# Patient Record
Sex: MALE | Race: WHITE | NOT HISPANIC OR LATINO | Employment: FULL TIME | URBAN - METROPOLITAN AREA
[De-identification: names, ages, dates, MRNs, and addresses within clinical notes are randomized per-mention and may not be internally consistent; named-entity substitution may affect disease eponyms.]

---

## 2017-01-29 ENCOUNTER — HOSPITAL ENCOUNTER (EMERGENCY)
Facility: CLINIC | Age: 62
Discharge: HOME | End: 2017-01-29
Attending: EMERGENCY MEDICINE | Admitting: EMERGENCY MEDICINE
Payer: COMMERCIAL

## 2017-01-29 VITALS
WEIGHT: 176.37 LBS | OXYGEN SATURATION: 95 % | HEIGHT: 69 IN | RESPIRATION RATE: 23 BRPM | DIASTOLIC BLOOD PRESSURE: 88 MMHG | TEMPERATURE: 97.5 F | SYSTOLIC BLOOD PRESSURE: 138 MMHG | BODY MASS INDEX: 26.12 KG/M2 | HEART RATE: 90 BPM

## 2017-01-29 DIAGNOSIS — T78.2XXA ANAPHYLAXIS, INITIAL ENCOUNTER: ICD-10-CM

## 2017-01-29 DIAGNOSIS — T78.40XA ALLERGIC REACTION, INITIAL ENCOUNTER: ICD-10-CM

## 2017-01-29 PROCEDURE — 96375 TX/PRO/DX INJ NEW DRUG ADDON: CPT

## 2017-01-29 PROCEDURE — 96365 THER/PROPH/DIAG IV INF INIT: CPT

## 2017-01-29 PROCEDURE — 99284 EMERGENCY DEPT VISIT MOD MDM: CPT | Mod: 25

## 2017-01-29 PROCEDURE — 25000125 ZZHC RX 250: Performed by: EMERGENCY MEDICINE

## 2017-01-29 PROCEDURE — 99291 CRITICAL CARE FIRST HOUR: CPT | Performed by: EMERGENCY MEDICINE

## 2017-01-29 RX ORDER — DIPHENHYDRAMINE HYDROCHLORIDE 50 MG/ML
50 INJECTION INTRAMUSCULAR; INTRAVENOUS ONCE
Status: COMPLETED | OUTPATIENT
Start: 2017-01-29 | End: 2017-01-29

## 2017-01-29 RX ORDER — EPINEPHRINE 0.3 MG/.3ML
0.3 INJECTION SUBCUTANEOUS
Qty: 0.6 ML | Refills: 1 | Status: SHIPPED | OUTPATIENT
Start: 2017-01-29

## 2017-01-29 RX ORDER — DEXAMETHASONE SODIUM PHOSPHATE 10 MG/ML
10 INJECTION, SOLUTION INTRAMUSCULAR; INTRAVENOUS ONCE
Status: COMPLETED | OUTPATIENT
Start: 2017-01-29 | End: 2017-01-29

## 2017-01-29 RX ORDER — PREDNISONE 10 MG/1
TABLET ORAL
Qty: 32 TABLET | Refills: 0 | Status: SHIPPED | OUTPATIENT
Start: 2017-01-29 | End: 2017-02-08

## 2017-01-29 RX ADMIN — FAMOTIDINE 20 MG: 20 INJECTION, SOLUTION INTRAVENOUS at 09:54

## 2017-01-29 RX ADMIN — DEXAMETHASONE SODIUM PHOSPHATE 10 MG: 10 INJECTION, SOLUTION INTRAMUSCULAR; INTRAVENOUS at 09:52

## 2017-01-29 RX ADMIN — DIPHENHYDRAMINE HYDROCHLORIDE 50 MG: 50 INJECTION, SOLUTION INTRAMUSCULAR; INTRAVENOUS at 09:48

## 2017-01-29 NOTE — ED AVS SNAPSHOT
[facility] Emergency Department    [address] [address]    Phone:  [phone]    Fax:  [phone]                                       Antione Block   MRN: [id number]    Department:  [facility] Emergency Department   Date of Visit:  1/29/2017           After Visit Summary Signature Page     I have received my discharge instructions, and my questions have been answered. I have discussed any challenges I see with this plan with the nurse or doctor.    ..........................................................................................................................................  Patient/Patient Representative Signature      ..........................................................................................................................................  Patient Representative Print Name and Relationship to Patient    ..................................................               ................................................  Date                                            Time    ..........................................................................................................................................  Reviewed by Signature/Title    ...................................................              ..............................................  Date                                                            Time

## 2017-01-29 NOTE — ED NOTES
Took Aleve at 0815  - became lightheaded with itching all over at 0845 - hives all over - about 0900 became soa  And left for the hospital - lungs clear

## 2017-01-29 NOTE — DISCHARGE INSTRUCTIONS
Avoid NSAIDs (Aleve, Naprosyn, naproxen, ibuprofen, Advil,) and aspirin until you follow up with the allergy clinic.    Prednisone burst and taper as directed.  This can cause insomnia and may also cause GI upset.  Take this with food.  Benadryl 25-50 mg every 4-6 hours as needed for rash or itching  Zantac or Tagamet over-the-counter tablet once to twice daily as needed for rash or itching    You were provided and EpiPen to use if you have recurrent hives, shortness of breath, chest tightness, or difficulty with speech or swallowing.  If he uses the EpiPen you must immediately go to the emergency room for reassessment.

## 2017-01-29 NOTE — ED AVS SNAPSHOT
[facility] Emergency Department    [address] [address]    Phone:  [phone]    Fax:  [phone]                                       Antione Block   MRN: [id number]    Department:  [facility] Emergency Department   Date of Visit:  1/29/2017           Patient Information     Date Of Birth          1955        Your diagnoses for this visit were:     Allergic reaction, initial encounter     Anaphylaxis, initial encounter        You were seen by Lupillo Acevedo MD.      Follow-up Information     Schedule an appointment as soon as possible for a visit with Winston Dominguez MD.    Specialty:  Allergy & Immunology    Contact information:    [facility]  [address] [address]  [phone]          Discharge Instructions       Avoid NSAIDs (Aleve, Naprosyn, naproxen, ibuprofen, Advil,) and aspirin until you follow up with the allergy clinic.    Prednisone burst and taper as directed.  This can cause insomnia and may also cause GI upset.  Take this with food.  Benadryl 25-50 mg every 4-6 hours as needed for rash or itching  Zantac or Tagamet over-the-counter tablet once to twice daily as needed for rash or itching    You were provided and EpiPen to use if you have recurrent hives, shortness of breath, chest tightness, or difficulty with speech or swallowing.  If he uses the EpiPen you must immediately go to the emergency room for reassessment.    Discharge References/Attachments     ANAPHYLAXIS, GENERAL (ENGLISH)      24 Hour Appointment Hotline       To make an appointment at any [facility], call [phone] ([phone]). If you don't have a family doctor or clinic, we will help you find one. [facility] are conveniently located to serve the needs of you and your family.             Review of your medicines      START taking        Dose / Directions Last dose taken    EPINEPHrine 0.3 MG/0.3ML injection   Dose:  0.3 mg   Quantity:  0.6  "mL        Inject 0.3 mLs (0.3 mg) into the muscle once as needed   Refills:  1        predniSONE 10 MG tablet   Commonly known as:  DELTASONE   Quantity:  32 tablet        Take 4 tablets daily for 5 days,  take 2 tablets daily for 3 days, take 1 tablet daily for 3 days, take half a tablet for 3 days.   Refills:  0          Our records show that you are taking the medicines listed below. If these are incorrect, please call your family doctor or clinic.        Dose / Directions Last dose taken    aspirin 81 MG tablet   Dose:  81 mg        Take 81 mg by mouth daily   Refills:  0                Prescriptions were sent or printed at these locations (2 Prescriptions)                   [facility] [place], [place] - [address]   [address] [address]    Telephone:  [phone]   Fax:  [phone]   Hours:                  E-Prescribed (2 of 2)         predniSONE (DELTASONE) 10 MG tablet               EPINEPHrine 0.3 MG/0.3ML injection                Orders Needing Specimen Collection     None      Pending Results     No orders found from 1/28/2017 to 1/30/2017.            Pending Culture Results     No orders found from 1/28/2017 to 1/30/2017.             Test Results from your hospital stay            Thank you for choosing [facility]       Thank you for choosing [facility] for your care. Our goal is always to provide you with excellent care. Hearing back from our patients is one way we can continue to improve our services. Please take a few minutes to complete the written survey that you may receive in the mail after you visit with us. Thank you!        [facility] Information     [facility] lets you send messages to your doctor, view your test results, renew your prescriptions, schedule appointments and more. To sign up, go to [email or web address] . Click on \"Log in\" on the left side of the screen, which will take you to the Welcome page. Then click on \"Sign up Now\" on the right side of the " page.     You will be asked to enter the access code listed below, as well as some personal information. Please follow the directions to create your username and password.     Your access code is: [email or web address]  Expires: 2017  1:29 PM     Your access code will  in 90 days. If you need help or a new code, please call your [facility] clinic or [phone].        Care EveryWhere ID     This is your Care EveryWhere ID. This could be used by other organizations to access your [facility] medical records  [id number]        After Visit Summary       This is your record. Keep this with you and show to your community pharmacist(s) and doctor(s) at your next visit.

## 2017-01-29 NOTE — ED PROVIDER NOTES
History     Chief Complaint   Patient presents with     Allergic Reaction     sob and rerd     HPI  Mr. Antione Block is a 61 year old male who presents to the ED today for evaluation of allergic reaction. The patient reports sudden onset of rash and erythema after taking Aleve for a headache this morning, approximately 30 minutes before arrival to the ED. The patient states the rash to his trunk and face with bilateral eye redness began about 15 minutes after taking the Aleve. The patient presented to the ED with O2 stat of 88%, improving to 95% after being roomed. The patient's wife reports he was short of breath and wheezing during initial onset of her symptoms but this has since resolved. He did not take anything for his current symptoms. He denies past incident of pneumonia or known allergies. He denies cough, fever, vomiting, cold, abdominal pain, nausea, vomiting, or diarrhea.  Patient has never had similar reaction previously.  He has used this product previously.  He is also on daily aspirin.  He did not take that today however.  No other new exposures or recent illness.  Patient had a mild to moderate headache today.  That has resolved.    I have reviewed the Medications, Allergies, Past Medical and Surgical History, and Social History in the [facility] system.  History reviewed. No pertinent past medical history.  Patient Active Problem List   Diagnosis     CARDIOVASCULAR SCREENING; LDL GOAL LESS THAN 130     Tobacco use disorder     Current Facility-Administered Medications   Medication     famotidine (PEPCID) infusion 20 mg     Current Outpatient Prescriptions   Medication     aspirin 81 MG tablet     predniSONE (DELTASONE) 10 MG tablet     EPINEPHrine 0.3 MG/0.3ML injection      No Known Allergies  Social History     Social History     Marital Status:      Spouse Name: N/A     Number of Children: N/A     Years of Education: N/A     Occupational History     Not on file.     Social History Main  "Topics     Smoking status: Current Every Day Smoker -- 1.00 packs/day for 43 years     Types: Cigarettes     Smokeless tobacco: Never Used     Alcohol Use: Yes     Drug Use: No     Sexual Activity: Not on file     Other Topics Concern     Parent/Sibling W/ Cabg, Mi Or Angioplasty Before 65f 55m? Yes     Social History Narrative     Family History   Problem Relation Age of Onset     CANCER Mother      bone and lung     Cardiovascular Brother      Review of Systems  All other systems reviewed and are negative.  Physical Exam   Pulse: 90  Temp: 97.5  F (36.4  C)  Resp: 18  Height: 175.3 cm (5' 9\")  Weight: 80 kg (176 lb 5.9 oz)  SpO2: (!) 88 %  Physical Exam Gen. alert cooperative male in moderate distress.  HEENT shows intense scleral injection with watery but no mattering.  No facial or lip swelling.  Oral mucosa is moist and there is no soft palate or posterior pharyngeal edema.  He is able to speak in complete sentences.  Neck reveals no stridor.  Lungs have no active wheezing.  Cardiac regular rate and not tachycardic.  Abdomen reveals active bowel sounds on palpation there is no localizing tenderness, masses, organomegaly.  Skin reveals diffuse confluence hives on his face, neck, trunk and extremities.  There is no open or draining lesions.    ED Course   Procedures             Critical Care time:  none               Labs Ordered and Resulted from Time of ED Arrival Up to the Time of Departure from the ED - No data to display  No results found for this or any previous visit (from the past 24 hour(s)).    Medications   famotidine (PEPCID) infusion 20 mg (0 mg Intravenous Stopped 1/29/17 1025)   dexamethasone (DECADRON) injection 10 mg (10 mg Intravenous Given 1/29/17 4791)   diphenhydrAMINE (BENADRYL) injection 50 mg (50 mg Intravenous Given 1/29/17 0948)       9:38 AM Patient Assessed.  IV is established.  Patient was given Benadryl, Decadron, and Pepcid.  At 10 AM on recheck the patient improving.  He was not " tachycardic or hypotensive.  With O2 by nasal cannula and his sats were 95%.  At 10:15 AM on recheck the rash and intensity of the redness in his eye was resolving.  He had no respiratory complaints denied chest pain.  Sats were 95%.  At 11 AM on recheck the patient was resting complete.  The rash had resolved.  The intensity of his eye redness had resolved.  He had no adventitious lung sounds.  No neck stridor.  His O2 sat was 97% so the O2 was discontinue  At 12 PM and check no change in patient's comfortable.  At 1 PM on recheck no change.  O2 sat maintaining in the mid 90s without O2  At 1:30 PM on recheck patient is resting comfortable.  No hives are noted.  His eyes have cleared.  He had no stridor on neck exam and no wheezing on lung exam.  Assessments & Plan (with Medical Decision Making)   Patient is a 61-year-old male presents to the emergency room with hives and hypoxia after he took Aleve earlier this morning.  Approximately 15 minutes after taking the Aleve he developed itchy rash on his entire body.  He did not have change in his voice or difficulty swallowing.  He denies chest pain and felt mildly short of breath.  No abdominal pain or nausea.  No similar previous reactions.  He's not had allergies to other medicines.  No other new exposures or illness.  He did have a mild headache earlier and took the Aleve for that.  His headache has resolved.  He is a smoker.  He denies COPD.  He does take aspirin but has done that for years and has never had problems.  On exam initially he was hypoxic with sats in the upper 80s.  His vitals are stable.  He was not tachycardic or hypotensive.  He was able to speak in complete sentences.  He did have injection and watering of his eyes.  Neck revealed no stridor and lungs had no adventitious sounds.  Patient had an IV established given fluids, IV Benadryl, Pepcid, and Decadron.  He was given O2 by nasal cannula and had slow improvement and resolution of his symptoms.   He was observed for an extended period of time thereafter and his vital signs remained stable.  He had no recurrent hives or hypoxia.  Suspect his symptoms are related to the Aleve.  I recommend he avoid Aleve or similar products such as Naprosyn, Advil, ibuprofen and avoid his aspirin temporarily.  I would like to have him follow up with allergist for further testing.  He is given EpiPen for recurrence with the understanding needs to return to emergency room if he has similar symptoms and uses the EpiPen.  In the meantime we'll put him on a steroid burst and taper.  Have him continue Benadryl and Zantac or Tagamet as needed.  Handout on anaphylaxis is provided.  With the severity of the patient's symptoms, treatment with IV medicines and repeated checks there was 45 minutes of critical care time.  I have reviewed the nursing notes.    I have reviewed the findings, diagnosis, plan and need for follow up with the patient.    New Prescriptions    EPINEPHRINE 0.3 MG/0.3ML INJECTION    Inject 0.3 mLs (0.3 mg) into the muscle once as needed    PREDNISONE (DELTASONE) 10 MG TABLET    Take 4 tablets daily for 5 days,  take 2 tablets daily for 3 days, take 1 tablet daily for 3 days, take half a tablet for 3 days.       Final diagnoses:   Allergic reaction, initial encounter   Anaphylaxis, initial encounter     This document serves as a record of the services and decisions personally performed and made by Lupillo Acevedo MD. It was created on HIS/HER behalf by Joe Weiner, a trained medical scribe. The creation of this document is based the provider's statements to the medical scribe.  Joe Weiner 9:38 AM 1/29/2017    Provider:   The information in this document, created by the medical scribe for me, accurately reflects the services I personally performed and the decisions made by me. I have reviewed and approved this document for accuracy prior to leaving the patient care area.  Lupillo Acevedo MD 9:38 AM  1/29/2017 1/29/2017   [facility] EMERGENCY DEPARTMENT      Lupillo Acevedo MD  01/29/17 1320    Lupillo Acevedo MD  01/29/17 1323    Lupillo Acevedo MD  01/29/17 3257

## 2017-01-30 ENCOUNTER — OFFICE VISIT (OUTPATIENT)
Dept: ALLERGY | Facility: CLINIC | Age: 62
End: 2017-01-30
Payer: COMMERCIAL

## 2017-01-30 VITALS
SYSTOLIC BLOOD PRESSURE: 127 MMHG | BODY MASS INDEX: 29.03 KG/M2 | DIASTOLIC BLOOD PRESSURE: 70 MMHG | HEIGHT: 69 IN | OXYGEN SATURATION: 95 % | WEIGHT: 196 LBS | TEMPERATURE: 97 F | HEART RATE: 75 BPM

## 2017-01-30 DIAGNOSIS — T50.905A MEDICATION REACTION, INITIAL ENCOUNTER: Primary | ICD-10-CM

## 2017-01-30 PROCEDURE — 99203 OFFICE O/P NEW LOW 30 MIN: CPT | Performed by: ALLERGY & IMMUNOLOGY

## 2017-01-30 ASSESSMENT — ENCOUNTER SYMPTOMS
ACTIVITY CHANGE: 0
CHEST TIGHTNESS: 0
DIARRHEA: 0
EYE ITCHING: 0
STRIDOR: 0
COUGH: 0
ADENOPATHY: 0
WHEEZING: 0
FEVER: 0
VOMITING: 0
EYE REDNESS: 0
SHORTNESS OF BREATH: 0
FATIGUE: 0
EYE DISCHARGE: 0
SINUS PRESSURE: 0
MYALGIAS: 0
NAUSEA: 0
RHINORRHEA: 0
HEADACHES: 0
FACIAL SWELLING: 0

## 2017-01-30 NOTE — NURSING NOTE
"Chief Complaint   Patient presents with     Allergy Consult     Ed visit 1/29       Initial /70 mmHg  Pulse 75  Temp(Src) 97  F (36.1  C)  Ht 5' 9\" (1.753 m)  Wt 196 lb (88.905 kg)  BMI 28.93 kg/m2  SpO2 95% Estimated body mass index is 28.93 kg/(m^2) as calculated from the following:    Height as of this encounter: 5' 9\" (1.753 m).    Weight as of this encounter: 196 lb (88.905 kg).  BP completed using cuff size: regular    "

## 2017-01-30 NOTE — PROGRESS NOTES
SUBJECTIVE:                                                               Antione Block presents today to our Allergy Clinic at [facility]; he is being seen for a new patient visit.   He is a 61 year old male with a recent episode of allergic reaction.  The patient is accompanied by spouse. The spouse helps providing the history.     The patient reports sudden onset of generalized rash and erythema of his eyes after taking Aleve for a headache this morning, approximately 15 min after ingestion. He describes rash as pruritic, red, raised skin lesions of different sizes.  He also developed dyspnea. No wheezing. No tongue/lip/throat swelling. No nausea, vomiting or diarrhea. Had some dizziness as well. His wife gave him 3 cups of Benadryl, and took him to ED.   He did not take anything for his current symptoms. He denies cough, fever, vomiting, cold, abdominal pain, nausea, vomiting, or diarrhea. it was his first reaction like this. He was able to take Aleve in the past, nut not frequently.  He was also on daily aspirin 81 tab but he did not take that on that day. That morning all he ingested besides Aleve was a cup of coffee.     In ED, he got better with Benadryl, Pepcid, and Decadron.  He was given O2 by nasal cannula as well. No epi.Symptoms got much better in ~5 hrs after ingestion. Was prescribed EpiPen though that he has with him today.    He was presrcibed prednisone for 5 days. He takes Zantac 150 mg BID, and Benadryl daily.   He denies having baseline recurrent acute or chronic urticaria.  Has no history of nasal polyposis. He denies other episodes of wheezing, chest tightness or dyspnea.      Patient Active Problem List   Diagnosis     CARDIOVASCULAR SCREENING; LDL GOAL LESS THAN 130     Tobacco use disorder       History reviewed. No pertinent past medical history.   Problem (# of Occurrences) Relation (Name,Age of Onset)    CANCER (1) Mother: bone and lung    Cardiovascular (1)  Brother        History reviewed. No pertinent past surgical history.  Social History     Social History     Marital Status:      Spouse Name: N/A     Number of Children: N/A     Years of Education: N/A     Social History Main Topics     Smoking status: Current Every Day Smoker -- 0.25 packs/day for 43 years     Types: Cigarettes     Smokeless tobacco: Never Used     Alcohol Use: Yes      Comment: Smokes e cigerettes 1/30/17     Drug Use: No     Sexual Activity: Not Asked     Other Topics Concern     Parent/Sibling W/ Cabg, Mi Or Angioplasty Before 65f 55m? Yes     Social History Narrative    ENVIRONMENTAL HISTORY: The family [place] older home(35 years old[place]. The home is heated with a forced air and gas furnace. They does have central air conditioning. The patient's bedroom is furnished with stuffed animals in bed, hard delon in bedroom, allergen mattress cover and fabric window coverings.   There is not history of cockroach or mice infestation. There is/are 1 smokers not in the house.  The house does not have a damp basement.                Review of Systems   Constitutional: Negative for fever, activity change and fatigue.   HENT: Negative for congestion, ear pain, facial swelling, nosebleeds, postnasal drip, rhinorrhea, sinus pressure and sneezing.    Eyes: Negative for discharge, redness and itching.   Respiratory: Negative for cough, chest tightness, shortness of breath, wheezing and stridor.    Gastrointestinal: Negative for nausea, vomiting and diarrhea.   Musculoskeletal: Negative for myalgias.   Skin: Negative for rash.   Neurological: Negative for headaches.   Hematological: Negative for adenopathy.   Psychiatric/Behavioral: Negative for behavioral problems and self-injury.           Current outpatient prescriptions:      predniSONE (DELTASONE) 10 MG tablet, Take 4 tablets daily for 5 days,  take 2 tablets daily for 3 days, take 1 tablet daily for 3 days, take half a  "tablet for 3 days., Disp: 32 tablet, Rfl: 0     EPINEPHrine 0.3 MG/0.3ML injection, Inject 0.3 mLs (0.3 mg) into the muscle once as needed, Disp: 0.6 mL, Rfl: 1     aspirin 81 MG tablet, Take 81 mg by mouth daily, Disp: , Rfl:   Immunization History   Administered Date(s) Administered     TDAP (ADACEL AGES 11-64) 03/25/2010     Allergies   Allergen Reactions     Aleve [Naproxen] Anaphylaxis     OBJECTIVE:                                                                 /70 mmHg  Pulse 75  Temp(Src) 97  F (36.1  C)  Ht 5' 9\" (1.753 m)  Wt 196 lb (88.905 kg)  BMI 28.93 kg/m2  SpO2 95%        Physical Exam   Constitutional: He is oriented to person, place, and time. No distress.   HENT:   Head: Normocephalic and atraumatic.   Right Ear: Tympanic membrane, external ear and ear canal normal.   Left Ear: Tympanic membrane, external ear and ear canal normal.   Nose: No mucosal edema or rhinorrhea.   Eyes: Conjunctivae are normal. Right eye exhibits no discharge. Left eye exhibits no discharge.   Neck: Normal range of motion.   Cardiovascular: Normal rate, regular rhythm and normal heart sounds.    No murmur heard.  Pulmonary/Chest: Effort normal and breath sounds normal. No respiratory distress. He has no wheezes. He has no rales.   Musculoskeletal: Normal range of motion.   Lymphadenopathy:     He has no cervical adenopathy.   Neurological: He is alert and oriented to person, place, and time.   Skin: Skin is warm. He is not diaphoretic.   Psychiatric: Mood, memory and affect normal.   Nursing note and vitals reviewed.        ASSESSMENT/PLAN:      Problem List Items Addressed This Visit     None      Visit Diagnoses     1. Medication reaction, initial encounter    -  Primary  Reaction to NSAID, type IV (Blended (mixed respiratory and/or cutaneous) reactions in otherwise asymptomatic individuals) versus Type V or type 6.  -Finish prednisone as presrcibed.  Zantac 150 mg by mouth twice a day for 10 days, and then " stop.  Zyrtec (cetirizine) 10 mg by mouth once a day at bedtime for 10 days, then stop.  Provided with a list of ASA/NSAIDs containing drugs.  The patient has an interest to take aspirin in the future, or there is a medical indication for that, I suggest a diagnostic challenge with ASA to confirm that he may not be sensitive to all GALLAGHER-1 inhibitors. The patient does not seem to be sure about it. His wife is more worried about his health and some family history of cardiac problems. They will talk with PCP/or cardiologist, and then call us to schedule the challenge if they decide to do that.           Thank you for allowing us to participate in the care of this patient. Please feel free to contact us if there are any questions or concerns about the patient.    Disclaimer: This note consists of symbols derived from keyboarding, dictation and/or voice recognition software. As a result, there may be errors in the script that have gone undetected. Please consider this when interpreting information found in this chart.    Winston Dominguez MD   Allergy, Asthma and Immunology  [place], [place] and [facility]

## 2017-01-30 NOTE — MR AVS SNAPSHOT
After Visit Summary   1/30/2017    Antione Blcok    MRN: [id number]           Patient Information     Date Of Birth          1955        Visit Information        Provider Department      1/30/2017 12:20 PM Winston Dominguez MD [facility]        Today's Diagnoses     Medication reaction, initial encounter    -  1       Care Instructions    Finish prednisone as presrcibed.  Zantac 150 mg by mouth twice a day for 10 days, and then stop.  Zyrtec (cetirizine) 10 mg by mouth once a day at bedtime for 10 days, then stop.      APIRINS/NSAID-SENSITIVE INDIVIDUALS: PRODUCTS TO AVOID  ASPIRIN (ACETYLSALICILIC ACID)  Chanel-[place]/Plus Duoprin Mobigesic Tablets  Arthritis Pain Formula Duradyne Hs-Dhv-Xjofx Tablets  Anacin Dynosal Pabalate   Ascriptin Ecotrin Palbrin Buffered Tablets  Aspergum Efficin Pepto-Bismol  Blas Emagin Percodan  Bufferin Empirin Propoxyphene  Cama Excedrin (aspirin formula) 65 (Darvon Compound)  Congesprin 4-Way Cold Tablets Robasisal Tablets  [place] Fiorinal Sine-Off Sinus Medicine  Coridicin Gaysal-S Synalogos  Cosprin Gemnisin Talwin  Dasin Measurin Triaminicin  Zachary s Pills Midol Caplets Vanquish    METHYL SALICYLATE (WINTERGREEN)  (Topical anti-pain or anti-swelling products)  Panchito Schmidt Vicks Sinex Listerine  Heet Decongestant Spray Mentholatum  Icy Hot Heating Lotion Soltice     NONSTEROIDAL ANTI-INFLAMMATORY AGENTS  (Cross-react in aspirin-sensitive individuals)  Alclofenac (Mervan) Ketorolac (Toradol)  Azapropaxone (Rheumox) Meclofenamate (Meclomen)  Diclofenac (Voltarol) Mefenamic Acid (Ponstel)  Diflunisal (Colobid) Nabumetone (Relafen)  Etodolac (Lodine) Naproxen (Naprosyn, Anaprox)  Fenoprofen (Nalfon) Oxyprozin (Daypro)  Feprazone (Methrazone) Pehnylbutazone (Veutazolidin, Azolid)  Flurbiprofen (Ansaid) Oxalic (Tandearil)  Ibuprofen (Motrin, Nuprin, Advil, Medipren) Piroxicam (Feldene)  Trandar (Rufen) Sulindac (Clinoril)  Indomethacin (Indocin) Tometin  "(Tolecctin)  Ketoprofen (Orudis) Zomepirac (Zomax)  This list is a guide, but is not all inclusive of every medication or ingredient. Check with your doctor and pharmacist regarding specific contents for medications. Read ingredient labels carefully.            Follow-ups after your visit        Who to contact     If you have questions or need follow up information about today's clinic visit or your schedule please contact [facility] directly at [phone].  Normal or non-critical lab and imaging results will be communicated to you by [employer], letter or phone within 4 business days after the clinic has received the results. If you do not hear from us within 7 days, please contact the clinic through [employer] or phone. If you have a critical or abnormal lab result, we will notify you by phone as soon as possible.  Submit refill requests through [employer] ([employer]) or call your pharmacy and they will forward the refill request to us. Please allow 3 business days for your refill to be completed.          Additional Information About Your Visit        [employer] [facility] Hospital[facility] Information     [employer] ([employer]) lets you send messages to your doctor, view your test results, renew your prescriptions, schedule appointments and more. To sign up, go to [email or web address] ([employer]) . Click on \"Log in\" on the left side of the screen, which will take you to the Welcome page. Then click on \"Sign up Now\" on the right side of the page.     You will be asked to enter the access code listed below, as well as some personal information. Please follow the directions to create your username and password.     Your access code is: [email or web address]  Expires: 2017  1:29 PM     Your access code will  in 90 days. If you need help or a new code, please call your [facility] or [phone].        Care EveryWhere ID     This is your Care EveryWhere ID. This could be used by other organizations to access your [place] medical records  [id number]        Your " "Vitals Were     Pulse Temperature Height BMI (Body Mass Index) Pulse Oximetry       75 97  F (36.1  C) 5' 9\" (1.753 m) 28.93 kg/m2 95%        Blood Pressure from Last 3 Encounters:   01/30/17 127/70   01/29/17 138/88   09/20/16 110/64    Weight from Last 3 Encounters:   01/30/17 196 lb (88.905 kg)   01/29/17 176 lb 5.9 oz (80 kg)   09/20/16 200 lb 12.8 oz (91.082 kg)              Today, you had the following     No orders found for display       Primary Care Provider    Clinic Unassigned MD MARGARET       No address on file        Thank you!     Thank you for choosing [facility]  for your care. Our goal is always to provide you with excellent care. Hearing back from our patients is one way we can continue to improve our services. Please take a few minutes to complete the written survey that you may receive in the mail after your visit with us. Thank you!             Your Updated Medication List - Protect others around you: Learn how to safely use, store and throw away your medicines at www.disposemymeds.org.          This list is accurate as of: 1/30/17  1:30 PM.  Always use your most recent med list.                   Brand Name Dispense Instructions for use    aspirin 81 MG tablet      Take 81 mg by mouth daily       EPINEPHrine 0.3 MG/0.3ML injection     0.6 mL    Inject 0.3 mLs (0.3 mg) into the muscle once as needed       predniSONE 10 MG tablet    DELTASONE    32 tablet    Take 4 tablets daily for 5 days,  take 2 tablets daily for 3 days, take 1 tablet daily for 3 days, take half a tablet for 3 days.         "

## 2017-01-30 NOTE — PATIENT INSTRUCTIONS
Finish prednisone as presrcibed.  Zantac 150 mg by mouth twice a day for 10 days, and then stop.  Zyrtec (cetirizine) 10 mg by mouth once a day at bedtime for 10 days, then stop.      APIRINS/NSAID-SENSITIVE INDIVIDUALS: PRODUCTS TO AVOID  ASPIRIN (ACETYLSALICILIC ACID)  Chanel-[place]/Plus Duoprin Mobigesic Tablets  Arthritis Pain Formula Duradyne Au-Zpy-Qczcj Tablets  Anacin Dynosal Pabalate   Ascriptin Ecotrin Palbrin Buffered Tablets  Aspergum Efficin Pepto-Bismol  Blas Emagin Percodan  Bufferin Empirin Propoxyphene  Cama Excedrin (aspirin formula) 65 (Darvon Compound)  Congesprin 4-Way Cold Tablets Robasisal Tablets  [place] Fiorinal Sine-Off Sinus Medicine  Coridicin Gaysal-S Synalogos  Cosprin Gemnisin Talwin  Dasin Measurin Triaminicin  Zachary s Pills Midol Caplets Vanquish    METHYL SALICYLATE (WINTERGREEN)  (Topical anti-pain or anti-swelling products)  Panchito Schmidt Vicks Sinex Listerine  Heet Decongestant Spray Mentholatum  Icy Hot Heating Lotion Soltice     NONSTEROIDAL ANTI-INFLAMMATORY AGENTS  (Cross-react in aspirin-sensitive individuals)  Alclofenac (Mervan) Ketorolac (Toradol)  Azapropaxone (Rheumox) Meclofenamate (Meclomen)  Diclofenac (Voltarol) Mefenamic Acid (Ponstel)  Diflunisal (Colobid) Nabumetone (Relafen)  Etodolac (Lodine) Naproxen (Naprosyn, Anaprox)  Fenoprofen (Nalfon) Oxyprozin (Daypro)  Feprazone (Methrazone) Pehnylbutazone (Veutazolidin, Azolid)  Flurbiprofen (Ansaid) Oxalic (Tandearil)  Ibuprofen (Motrin, Nuprin, Advil, Medipren) Piroxicam (Feldene)  Trandar (Rufen) Sulindac (Clinoril)  Indomethacin (Indocin) Tometin (Tolecctin)  Ketoprofen (Orudis) Zomepirac (Zomax)  This list is a guide, but is not all inclusive of every medication or ingredient. Check with your doctor and pharmacist regarding specific contents for medications. Read ingredient labels carefully.

## 2017-03-23 ENCOUNTER — TELEPHONE (OUTPATIENT)
Dept: FAMILY MEDICINE | Facility: CLINIC | Age: 62
End: 2017-03-23

## 2017-03-23 DIAGNOSIS — Z12.11 SPECIAL SCREENING FOR MALIGNANT NEOPLASMS, COLON: Primary | ICD-10-CM

## 2017-03-23 NOTE — TELEPHONE ENCOUNTER
Panel Management Review      Patient has the following on his problem list: None      Composite cancer screening  Chart review shows that this patient is due/due soon for the following Fecal Colorectal (FIT)  Summary:    Patient is due/failing the following:   FIT    Action needed:   Patient needs referral/order: fit    Type of outreach:    Phone, spoke to patient.  agreed to complete Fit test    Questions for provider review:    None                                                                                                                                    Eli Colmenares MA       Chart routed to Care Team .

## 2018-02-06 ENCOUNTER — TRANSFERRED RECORDS (OUTPATIENT)
Dept: HEALTH INFORMATION MANAGEMENT | Facility: CLINIC | Age: 63
End: 2018-02-06

## 2018-04-25 ENCOUNTER — OFFICE VISIT (OUTPATIENT)
Dept: FAMILY MEDICINE | Facility: CLINIC | Age: 63
End: 2018-04-25
Payer: COMMERCIAL

## 2018-04-25 VITALS
DIASTOLIC BLOOD PRESSURE: 64 MMHG | BODY MASS INDEX: 28.14 KG/M2 | RESPIRATION RATE: 18 BRPM | TEMPERATURE: 97.8 F | HEIGHT: 69 IN | WEIGHT: 190 LBS | SYSTOLIC BLOOD PRESSURE: 114 MMHG | HEART RATE: 64 BPM

## 2018-04-25 DIAGNOSIS — F17.200 TOBACCO USE DISORDER: ICD-10-CM

## 2018-04-25 DIAGNOSIS — Z00.00 ROUTINE HISTORY AND PHYSICAL EXAMINATION OF ADULT: ICD-10-CM

## 2018-04-25 DIAGNOSIS — Z12.11 SPECIAL SCREENING FOR MALIGNANT NEOPLASMS, COLON: Primary | ICD-10-CM

## 2018-04-25 PROCEDURE — 99396 PREV VISIT EST AGE 40-64: CPT | Performed by: PHYSICIAN ASSISTANT

## 2018-04-25 RX ORDER — VARENICLINE TARTRATE 1 MG/1
1 TABLET, FILM COATED ORAL 2 TIMES DAILY
Qty: 56 TABLET | Refills: 3 | Status: SHIPPED | OUTPATIENT
Start: 2018-04-25 | End: 2021-03-30

## 2018-04-25 NOTE — PROGRESS NOTES
SUBJECTIVE:   CC: Antione Block is an 62 year old male who presents for preventative health visit.  it has multiple screening test performed yearly and brings those reports and with him.  This includes lipids, carotid ultrasound, EKG, metabolic panel and CBC and copies were made of these reports and will be scanned into the chart he has no concerns at this point other than.  His strong family history of heart disease.  He denies history of chest pain, shortness of breath or other symptoms.  He is trying to quit smoking and we discussed medication possibilities.    Physical   Annual:     Getting at least 3 servings of Calcium per day::  Yes    Bi-annual eye exam::  Yes    Dental care twice a year::  NO    Sleep apnea or symptoms of sleep apnea::  Daytime drowsiness and Excessive snoring    Diet::  Regular (no restrictions)    Frequency of exercise::  None    Taking medications regularly::  No    Barriers to taking medications::  Side effects    Medication side effects::  Other    Additional concerns today::  No                Today's PHQ-2 Score:   PHQ-2 ( 1999 Pfizer) 4/25/2018   Q1: Little interest or pleasure in doing things 0   Q2: Feeling down, depressed or hopeless 0   PHQ-2 Score 0   Q1: Little interest or pleasure in doing things Not at all   Q2: Feeling down, depressed or hopeless Not at all   PHQ-2 Score 0       Abuse: Current or Past(Physical, Sexual or Emotional)- No  Do you feel safe in your environment - Yes    Social History   Substance Use Topics     Smoking status: Current Every Day Smoker     Packs/day: 0.25     Years: 43.00     Types: Cigarettes     Smokeless tobacco: Never Used     Alcohol use Yes      Comment: Smokes e cigerettes 1/30/17     Alcohol Use 4/25/2018   If you drink alcohol do you typically have greater than 3 drinks per day OR greater than 7 drinks per week? No   No flowsheet data found.    Last PSA: No results found for: PSA    Reviewed orders with patient. Reviewed health  maintenance and updated orders accordingly - Yes  Labs reviewed in EPIC  BP Readings from Last 3 Encounters:   04/25/18 114/64   01/30/17 127/70   01/29/17 138/88    Wt Readings from Last 3 Encounters:   04/25/18 190 lb (86.2 kg)   01/30/17 196 lb (88.9 kg)   01/29/17 176 lb 5.9 oz (80 kg)                  Patient Active Problem List   Diagnosis     CARDIOVASCULAR SCREENING; LDL GOAL LESS THAN 130     Tobacco use disorder     History reviewed. No pertinent surgical history.    Social History   Substance Use Topics     Smoking status: Current Every Day Smoker     Packs/day: 0.25     Years: 43.00     Types: Cigarettes     Smokeless tobacco: Never Used     Alcohol use No      Comment: Smokes e cigerettes 1/30/17     Family History   Problem Relation Age of Onset     CANCER Mother      bone and lung     Cardiovascular Brother          Current Outpatient Prescriptions   Medication Sig Dispense Refill     EPINEPHrine 0.3 MG/0.3ML injection Inject 0.3 mLs (0.3 mg) into the muscle once as needed 0.6 mL 1     varenicline (CHANTIX STARTING MONTH PAK) 0.5 MG X 11 & 1 MG X 42 tablet Take 0.5 mg tab daily for 3 days, then 0.5 mg tab twice daily for 4 days, then 1 mg twice daily. 53 tablet 0     varenicline (CHANTIX) 1 MG tablet Take 1 tablet (1 mg) by mouth 2 times daily 56 tablet 3     aspirin 81 MG tablet Take 81 mg by mouth daily       Allergies   Allergen Reactions     Aleve [Naproxen] Anaphylaxis     Recent Labs   Lab Test  09/20/16   1315  02/02/15   0916   LDL  137*  121   HDL  38*  36*   TRIG  177*  176*   CR  0.89  0.91   GFRESTIMATED  87  85   GFRESTBLACK  >90   GFR Calc    >90   GFR Calc     POTASSIUM  4.0  4.2        Reviewed and updated as needed this visit by clinical staff         Reviewed and updated as needed this visit by Provider        History reviewed. No pertinent past medical history.   History reviewed. No pertinent surgical history.    Review of Systems  C: NEGATIVE for  fever, chills, change in weight  I: NEGATIVE for worrisome rashes, moles or lesions  E: NEGATIVE for vision changes or irritation  ENT: NEGATIVE for ear, mouth and throat problems  R: NEGATIVE for significant cough or SOB  B: NEGATIVE for masses, tenderness or discharge  CV: NEGATIVE for chest pain, palpitations or peripheral edema  GI: NEGATIVE for nausea, abdominal pain, heartburn, or change in bowel habits   male: negative for dysuria, hematuria, decreased urinary stream, erectile dysfunction, urethral discharge  M: NEGATIVE for significant arthralgias or myalgia  N: NEGATIVE for weakness, dizziness or paresthesias  P: NEGATIVE for changes in mood or affect    OBJECTIVE:   There were no vitals taken for this visit.    Physical Exam  GENERAL: healthy, alert and no distress  EYES: Eyes grossly normal to inspection, PERRL and conjunctivae and sclerae normal  HENT: ear canals and TM's normal, nose and mouth without ulcers or lesions  NECK: no adenopathy, no asymmetry, masses, or scars and thyroid normal to palpation  RESP: lungs clear to auscultation - no rales, rhonchi or wheezes  BREAST: normal without masses, tenderness or nipple discharge and no palpable axillary masses or adenopathy  CV: regular rate and rhythm, normal S1 S2, no S3 or S4, no murmur, click or rub, no peripheral edema and peripheral pulses strong  ABDOMEN: soft, nontender, no hepatosplenomegaly, no masses and bowel sounds normal  MS: no gross musculoskeletal defects noted, no edema  SKIN: no suspicious lesions or rashes  NEURO: Normal strength and tone, mentation intact and speech normal  PSYCH: mentation appears normal, affect normal/bright    ASSESSMENT/PLAN:       ICD-10-CM    1. Special screening for malignant neoplasms, colon Z12.11 Fecal colorectal cancer screen FIT   2. Tobacco use disorder F17.200 varenicline (CHANTIX STARTING MONTH KULWANT) 0.5 MG X 11 & 1 MG X 42 tablet     varenicline (CHANTIX) 1 MG tablet   3. Routine history and  "physical examination of adult Z00.00        COUNSELING:   Reviewed preventive health counseling, as reflected in patient instructions       Consider AAA screening for ages 65-75 and smoking history       Regular exercise       Healthy diet/nutrition       Vision screening       Hearing screening      Answers for HPI/ROS submitted by the patient on 4/25/2018   PHQ-2 Score: 0     reports that he has been smoking Cigarettes.  He has a 10.75 pack-year smoking history. He has never used smokeless tobacco.  Tobacco Cessation Action Plan: Pharmacotherapies : Chantix  Estimated body mass index is 28.94 kg/(m^2) as calculated from the following:    Height as of 1/30/17: 5' 9\" (1.753 m).    Weight as of 1/30/17: 196 lb (88.9 kg).   Weight management plan: Discussed healthy diet and exercise guidelines and patient will follow up in 12 months in clinic to re-evaluate.    Counseling Resources:  ATP IV Guidelines  Pooled Cohorts Equation Calculator  FRAX Risk Assessment  ICSI Preventive Guidelines  Dietary Guidelines for Americans, 2010  USDA's MyPlate  ASA Prophylaxis  Lung CA Screening    Avi Viera PA-C  [facility]  "

## 2018-04-25 NOTE — NURSING NOTE
"Chief Complaint   Patient presents with     Physical       Initial /64 (BP Location: Right arm, Cuff Size: Adult Large)  Pulse 64  Temp 97.8  F (36.6  C) (Tympanic)  Resp 18  Ht 5' 9\" (1.753 m)  Wt 190 lb (86.2 kg)  BMI 28.06 kg/m2 Estimated body mass index is 28.06 kg/(m^2) as calculated from the following:    Height as of this encounter: 5' 9\" (1.753 m).    Weight as of this encounter: 190 lb (86.2 kg).      Health Maintenance that is potentially due pending provider review:  Colonoscopy/FIT    Patient took home FIT test today.    Is there anyone who you would like to be able to receive your results? No  If yes have patient fill out ERICA      "

## 2018-04-25 NOTE — MR AVS SNAPSHOT
After Visit Summary   4/25/2018    Antione Block    MRN: [id number]           Patient Information     Date Of Birth          1955        Visit Information        Provider Department      4/25/2018 9:00 AM Avi Viera PA-C [facility]        Today's Diagnoses     Special screening for malignant neoplasms, colon    -  1    Tobacco use disorder        Routine history and physical examination of adult          Care Instructions      Preventive Health Recommendations  Male Ages 50 - 64    Yearly exam:             See your health care provider every year in order to  o   Review health changes.   o   Discuss preventive care.    o   Review your medicines if your doctor has prescribed any.     Have a cholesterol test every 5 years, or more frequently if you are at risk for high cholesterol/heart disease.     Have a diabetes test (fasting glucose) every three years. If you are at risk for diabetes, you should have this test more often.     Have a colonoscopy at age 50, or have a yearly FIT test (stool test). These exams will check for colon cancer.      Talk with your health care provider about whether or not a prostate cancer screening test (PSA) is right for you.    You should be tested each year for STDs (sexually transmitted diseases), if you re at risk.     Shots: Get a flu shot each year. Get a tetanus shot every 10 years.     Nutrition:    Eat at least 5 servings of fruits and vegetables daily.     Eat whole-grain bread, whole-wheat pasta and brown rice instead of white grains and rice.     Talk to your provider about Calcium and Vitamin D.     Lifestyle    Exercise for at least 150 minutes a week (30 minutes a day, 5 days a week). This will help you control your weight and prevent disease.     Limit alcohol to one drink per day.     No smoking.     Wear sunscreen to prevent skin cancer.     See your dentist every six months for an exam and cleaning.     See your eye doctor  "every 1 to 2 years.            Follow-ups after your visit        Future tests that were ordered for you today     Open Future Orders        Priority Expected Expires Ordered    Fecal colorectal cancer screen FIT Routine 2018            Who to contact     If you have questions or need follow up information about today's clinic visit or your schedule please contact [facility] directly at [phone].  Normal or non-critical lab and imaging results will be communicated to you by [employer], letter or phone within 4 business days after the clinic has received the results. If you do not hear from us within 7 days, please contact the clinic through [employer] or phone. If you have a critical or abnormal lab result, we will notify you by phone as soon as possible.  Submit refill requests through [employer] or call your pharmacy and they will forward the refill request to us. Please allow 3 business days for your refill to be completed.          Additional Information About Your Visit        [employer] Information     [employer] lets you send messages to your doctor, view your test results, renew your prescriptions, schedule appointments and more. To sign up, go to [email or web address] . Click on \"Log in\" on the left side of the screen, which will take you to the Welcome page. Then click on \"Sign up Now\" on the right side of the page.     You will be asked to enter the access code listed below, as well as some personal information. Please follow the directions to create your username and password.     Your access code is: [email or web address]  Expires: 2018  9:32 AM     Your access code will  in 90 days. If you need help or a new code, please call your [place] clinic or [phone].        Care EveryWhere ID     This is your Care EveryWhere ID. This could be used by other organizations to access your [place] medical records  [id number]        Your Vitals Were     Pulse Temperature " "Respirations Height BMI (Body Mass Index)       64 97.8  F (36.6  C) (Tympanic) 18 5' 9\" (1.753 m) 28.06 kg/m2        Blood Pressure from Last 3 Encounters:   04/25/18 114/64   01/30/17 127/70   01/29/17 138/88    Weight from Last 3 Encounters:   04/25/18 190 lb (86.2 kg)   01/30/17 196 lb (88.9 kg)   01/29/17 176 lb 5.9 oz (80 kg)               Primary Care Provider Fax #    Physician No Ref-Primary [phone]       No address on file        Equal Access to Services     [facility]: Hadnikole Bautista, nini valero, abbie cottomaujli cabrera, alden montoya . So [facility] [phone].    ATENCIÓN: Si habla español, tiene a tang disposición servicios gratuitos de asistencia lingüística. Llame al [phone].    We comply with applicable federal civil rights laws and [place] laws. We do not discriminate on the basis of race, color, national origin, age, disability, sex, sexual orientation, or gender identity.            Thank you!     Thank you for choosing [facility]  for your care. Our goal is always to provide you with excellent care. Hearing back from our patients is one way we can continue to improve our services. Please take a few minutes to complete the written survey that you may receive in the mail after your visit with us. Thank you!             Your Updated Medication List - Protect others around you: Learn how to safely use, store and throw away your medicines at www.disposemymeds.org.          This list is accurate as of 4/25/18  9:32 AM.  Always use your most recent med list.                   Brand Name Dispense Instructions for use Diagnosis    aspirin 81 MG tablet      Take 81 mg by mouth daily        EPINEPHrine 0.3 MG/0.3ML injection 2-pack    EPIPEN/ADRENACLICK/or ANY BX GENERIC EQUIV    0.6 mL    Inject 0.3 mLs (0.3 mg) into the muscle once as needed          "

## 2018-12-28 ENCOUNTER — HOSPITAL ENCOUNTER (EMERGENCY)
Facility: CLINIC | Age: 63
Discharge: HOME | End: 2018-12-28
Attending: PHYSICIAN ASSISTANT | Admitting: PHYSICIAN ASSISTANT
Payer: OTHER MISCELLANEOUS

## 2018-12-28 ENCOUNTER — APPOINTMENT (OUTPATIENT)
Dept: GENERAL RADIOLOGY | Facility: CLINIC | Age: 63
End: 2018-12-28
Attending: PHYSICIAN ASSISTANT
Payer: OTHER MISCELLANEOUS

## 2018-12-28 VITALS
OXYGEN SATURATION: 96 % | TEMPERATURE: 98 F | SYSTOLIC BLOOD PRESSURE: 133 MMHG | DIASTOLIC BLOOD PRESSURE: 83 MMHG | RESPIRATION RATE: 16 BRPM

## 2018-12-28 DIAGNOSIS — M25.511 ACUTE PAIN OF RIGHT SHOULDER: ICD-10-CM

## 2018-12-28 PROCEDURE — 99213 OFFICE O/P EST LOW 20 MIN: CPT | Mod: Z6 | Performed by: PHYSICIAN ASSISTANT

## 2018-12-28 PROCEDURE — 73030 X-RAY EXAM OF SHOULDER: CPT | Mod: RT

## 2018-12-28 PROCEDURE — G0463 HOSPITAL OUTPT CLINIC VISIT: HCPCS | Performed by: PHYSICIAN ASSISTANT

## 2018-12-28 NOTE — ED AVS SNAPSHOT
[facility] Department  [address] [address]  Phone:  [phone]  Fax:  [phone]                                    Antione Block   MRN: [id number]    Department:  [facility]   Date of Visit:  12/28/2018           After Visit Summary Signature Page    I have received my discharge instructions, and my questions have been answered. I have discussed any challenges I see with this plan with the nurse or doctor.    ..........................................................................................................................................  Patient/Patient Representative Signature      ..........................................................................................................................................  Patient Representative Print Name and Relationship to Patient    ..................................................               ................................................  Date                                   Time    ..........................................................................................................................................  Reviewed by Signature/Title    ...................................................              ..............................................  Date                                               Time          22EPIC Rev 08/18

## 2018-12-28 NOTE — ED PROVIDER NOTES
History     Chief Complaint   Patient presents with     Shoulder Injury     injured right shoulder this am pulled froze in recycle can     HPI  Antione Block is a 63 year old right hand dominant male who resents to the urgent care with concern over right shoulder pain after work-related injury earlier today.  Patient works as a  was attempting to pull up on a can and had sudden onset of pain in his right shoulder.  He complains of moderate pain, decreased ROM.  He denies any neck pain, headache, cough, dyspnea, wheezing, distal numbness or paresthesias.  He has not attempted any OTC treatment due to concerns that it could interfere with his medications and history of renal trnasplant.  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      Problem List:    Patient Active Problem List    Diagnosis Date Noted     Tobacco use disorder 02/02/2015     Priority: Medium     CARDIOVASCULAR SCREENING; LDL GOAL LESS THAN 130 10/31/2010     Priority: Medium        Past Medical History:    No past medical history on file.    Past Surgical History:    No past surgical history on file.    Family History:    Family History   Problem Relation Age of Onset     Cancer Mother         bone and lung     Cardiovascular Brother        Social History:  Marital Status:   [2]  Social History     Tobacco Use     Smoking status: Current Every Day Smoker     Packs/day: 0.25     Years: 43.00     Pack years: 10.75     Types: Cigarettes     Smokeless tobacco: Never Used   Substance Use Topics     Alcohol use: No     Comment: Smokes e cigerettes 1/30/17     Drug use: No        Medications:      aspirin 81 MG tablet   EPINEPHrine 0.3 MG/0.3ML injection   varenicline (CHANTIX STARTING MONTH PAK) 0.5 MG X 11 & 1 MG X 42 tablet   varenicline (CHANTIX) 1 MG tablet     Review of Systems  CONSTITUTIONAL:NEGATIVE for fever, chills, change in weight  INTEGUMENTARY/SKIN: NEGATIVE for  worrisome rashes, moles or lesions  RESP:NEGATIVE for significant cough or SOB  MUSCULOSKELETAL: POSITIVE  for right shoulder pain and NEGATIVE for other concerning arthralgias or myalgias   NEURO: NEGATIVE for numbness, paresthesias   Physical Exam   BP: 133/83  Heart Rate: 86  Temp: 98  F (36.7  C)  Resp: 16  SpO2: 96 %  Physical Exam   Constitutional: He is oriented to person, place, and time. He appears well-developed and well-nourished. No distress.   Cardiovascular: Normal rate and regular rhythm. Exam reveals no gallop and no friction rub.   No murmur heard.  Pulses:       Radial pulses are 2+ on the right side.   Pulmonary/Chest: Effort normal and breath sounds normal. He has no wheezes. He has no rales.   Musculoskeletal:        Right shoulder: He exhibits decreased range of motion, tenderness, swelling and pain. He exhibits no effusion, no crepitus, no deformity, no laceration and no spasm.        Cervical back: He exhibits normal range of motion, no tenderness, no bony tenderness, no swelling, no edema, no deformity, no pain and no spasm.        Arms:  Neurological: He is alert and oriented to person, place, and time. No sensory deficit.   Skin: Skin is warm and intact. Capillary refill takes less than 2 seconds. No abrasion, no ecchymosis, no laceration and no rash noted. No erythema.     ED Course        Procedures               Critical Care time:  none               Results for orders placed or performed during the hospital encounter of 12/28/18   Shoulder XR, 2 view, right    Narrative    SHOULDER TWO VIEWS RIGHT   12/28/2018 1:34 PM     HISTORY: Pain after forcefully puling on object at work earlier today.    COMPARISON: None.      Impression    IMPRESSION: Moderate acromioclavicular degenerative changes.  Glenohumeral joint is normal. No evidence of acute fracture or  subluxation.    DANIELA CESPEDES MD     Medications - No data to display    Assessments & Plan (with Medical Decision Making)     I have  reviewed the nursing notes.    I have reviewed the findings, diagnosis, plan and need for follow up with the patient.          Medication List      There are no discharge medications for this visit.       Final diagnoses:   Acute pain of right shoulder     Work-related injury earlier today when he attempted to lift something heavy.  He had stable vital signs upon arrival.  Physical exam findings as described above.  As part of evaluation he had x-ray of his right shoulder which was negative for acute fracture, dislocation, however there were moderate AC degenerative changes.  Symptoms most consistent with right shoulder strain.  He was discharged home stable with sling, instructions for continued OTC symptomatic treatment with rest, ice, tylenol as tolerated.      Disclaimer: This note consists of symbols derived from keyboarding, dictation, and/or voice recognition software. As a result, there may be errors in the script that have gone undetected.  Please consider this when interpreting information found in the chart.      12/28/2018   [facility] EMERGENCY DEPARTMENT     Maria Ines Dowd PA-C  12/31/18 1047

## 2019-01-03 ENCOUNTER — OFFICE VISIT (OUTPATIENT)
Dept: ORTHOPEDICS | Facility: CLINIC | Age: 64
End: 2019-01-03
Payer: OTHER MISCELLANEOUS

## 2019-01-03 VITALS
SYSTOLIC BLOOD PRESSURE: 138 MMHG | HEIGHT: 69 IN | DIASTOLIC BLOOD PRESSURE: 89 MMHG | BODY MASS INDEX: 29.18 KG/M2 | WEIGHT: 197 LBS

## 2019-01-03 DIAGNOSIS — S49.91XD INJURY OF RIGHT SHOULDER, SUBSEQUENT ENCOUNTER: Primary | ICD-10-CM

## 2019-01-03 DIAGNOSIS — M25.311 ROTATOR CUFF INSUFFICIENCY OF RIGHT SHOULDER: ICD-10-CM
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
## 2019-01-03 PROCEDURE — 99203 OFFICE O/P NEW LOW 30 MIN: CPT | Performed by: FAMILY MEDICINE

## 2019-01-03 RX ORDER — HYDROCODONE BITARTRATE AND ACETAMINOPHEN 5; 325 MG/1; MG/1
1 TABLET ORAL EVERY 6 HOURS PRN
Qty: 15 TABLET | Refills: 0 | Status: SHIPPED | OUTPATIENT
Start: 2019-01-03 | End: 2019-02-07

## 2019-01-03 ASSESSMENT — MIFFLIN-ST. JEOR: SCORE: 1678.97

## 2019-01-03 NOTE — LETTER
January 3, 2019      Antione was seen in my office today for a right shoulder injury on 12/28/2018.  He has significant pain and limitations of strength and ROM in his right shoulder, concerning for rotator cuff tear.  I have ordered an MRI to further evaluate, as well as physical therapy to start to work on exercises to maximize his function.  I do not recommend the use of his right arm for overhead activity, lift/carry more than 10 pounds, and push/pull more than 25 pounds.  Driving his work vehicle is not safe at this point.  I will follow up with him in 2 weeks, and hope to have his imaging done sooner when approved so that we can focus care appropriately.  His restrictions should be in place for the next 4 weeks for now.      Lazarus Pool DO, CAQ  Primary Care Sports Medicine  [facility]

## 2019-01-03 NOTE — LETTER
"    1/3/2019         RE: Antione Block  [address]  [facility] [address]        Dear Colleague,    Thank you for referring your patient, Antione Block, to the [facility]. Please see a copy of my visit note below.    Antione Block  :  1955  DOS: 1/3/2019  MRN: [id number]    Sports Medicine Clinic Visit    PCP: No Ref-Primary, Physician    Antione Block is a 63 year old Right hand dominant male who is seen as an ER referral presenting with right shoulder injury.    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  Pain located over right posterior, lateral shoulder, nonradiating.  Additional Features:  Positive: weakness and decreased AROM.  Symptoms are better with Ice and Rest.  Symptoms are worse with: shoulder flexion/abduction, lifting, lying on right shoulder.  Other evaluation and/or treatments so far consists of: Ice, Ibuprofen, Rest and ER visit.  Recent imaging completed: X-rays completed 18.  Prior History of related problems: none    Social History: works as  for [employer]     Review of Systems  Musculoskeletal: as above  Remainder of review of systems is negative including constitutional, CV, pulmonary, GI, Skin and Neurologic except as noted in HPI or medical history.    No past medical history on file.  No past surgical history on file.    Objective  /89   Ht 1.753 m (5' 9\")   Wt 89.4 kg (197 lb)   BMI 29.09 kg/m       General: healthy, alert and in no distress    HEENT: no scleral icterus or conjunctival erythema   Skin: no suspicious lesions or rash. No jaundice.   CV: regular rhythm by palpation, 2+ distal pulses, no pedal edema    Resp: normal respiratory effort without conversational dyspnea   Psych: normal mood and affect    Gait: nonantalgic, appropriate coordination and " balance   Neuro: normal light touch sensory exam of the extremities. Motor strength as noted below     Right Shoulder exam    ROM:        forward flexion 90        abduction 90       internal rotation SI joint       external rotation 20       All motions decreased       asymmetric scapular motion on R    Tender:        subacromial space       posterior shoulder    Non Tender:       remainder of shoulder       sternoclavicular joint       periscapular region    Strength:        abduction 3/5       internal rotation 5/5       external rotation 2/5       adduction 5/5    Impingement testing:        positive (+) Neer       positive (+) Honeycutt       positive (+) empty can       neg (-) crossover    Stability testing:       neg (-) anterior glide       neg (-) sulcus sign    Skin:       no visible deformities       well perfused       capillary refill brisk    Sensation:        normal sensation over shoulder and upper extremity       Radiology  Results for orders placed or performed during the hospital encounter of 12/28/18   Shoulder XR, 2 view, right    Narrative    SHOULDER TWO VIEWS RIGHT   12/28/2018 1:34 PM     HISTORY: Pain after forcefully puling on object at work earlier today.    COMPARISON: None.      Impression    IMPRESSION: Moderate acromioclavicular degenerative changes.  Glenohumeral joint is normal. No evidence of acute fracture or  subluxation.    DANIELA CESPEDES MD       Assessment:  1. Injury of right shoulder, subsequent encounter    2. Rotator cuff insufficiency of right shoulder    3. Work related injury        Plan:  Discussed the assessment with the patient.  Follow up: 2 weeks  Hopefully will get MRI approved for additional diagnostic information about possible RTC tear, based on hx and exam  XR images independently visualized and reviewed with patient today in clinic  No clear acute findings on XR, but does not evaluate RTC  [place] provided for severe, breakthrough pain, will not be an ongoing  treatment, reviewed appropriate uses and potential risks  Cannot take NSAIDs due to serious allergy  Work letter provided detailing restrictions   PT ordered, can start when approved  Home handouts provided and supportive care reviewed  All questions were answered today  Contact us with additional questions or concerns  Signs and sx of concern reviewed      Lazarus Gatica DO, HUEY  Primary Care Sports Medicine  [facility]           Disclaimer: This note consists of symbols derived from keyboarding, dictation and/or voice recognition software. As a result, there may be errors in the script that have gone undetected. Please consider this when interpreting information found in this chart.      Again, thank you for allowing me to participate in the care of your patient.        Sincerely,        Lazarus Gatica DO

## 2019-01-03 NOTE — PROGRESS NOTES
"Antione Block  :  1955  DOS: 1/3/2019  MRN: [id number]    Sports Medicine Clinic Visit    PCP: No Ref-Primary, Physician    Antione Block is a 63 year old Right hand dominant male who is seen as an ER referral presenting with right shoulder injury.    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  Pain located over right posterior, lateral shoulder, nonradiating.  Additional Features:  Positive: weakness and decreased AROM.  Symptoms are better with Ice and Rest.  Symptoms are worse with: shoulder flexion/abduction, lifting, lying on right shoulder.  Other evaluation and/or treatments so far consists of: Ice, Ibuprofen, Rest and ER visit.  Recent imaging completed: X-rays completed 18.  Prior History of related problems: none    Social History: works as  for [employer]     Review of Systems  Musculoskeletal: as above  Remainder of review of systems is negative including constitutional, CV, pulmonary, GI, Skin and Neurologic except as noted in HPI or medical history.    No past medical history on file.  No past surgical history on file.    Objective  /89   Ht 1.753 m (5' 9\")   Wt 89.4 kg (197 lb)   BMI 29.09 kg/m      General: healthy, alert and in no distress    HEENT: no scleral icterus or conjunctival erythema   Skin: no suspicious lesions or rash. No jaundice.   CV: regular rhythm by palpation, 2+ distal pulses, no pedal edema    Resp: normal respiratory effort without conversational dyspnea   Psych: normal mood and affect    Gait: nonantalgic, appropriate coordination and balance   Neuro: normal light touch sensory exam of the extremities. Motor strength as noted below     Right Shoulder exam    ROM:        forward flexion 90        abduction 90       internal rotation SI joint       external rotation 20       All motions decreased       " asymmetric scapular motion on R    Tender:        subacromial space       posterior shoulder    Non Tender:       remainder of shoulder       sternoclavicular joint       periscapular region    Strength:        abduction 3/5       internal rotation 5/5       external rotation 2/5       adduction 5/5    Impingement testing:        positive (+) Neer       positive (+) Honeycutt       positive (+) empty can       neg (-) crossover    Stability testing:       neg (-) anterior glide       neg (-) sulcus sign    Skin:       no visible deformities       well perfused       capillary refill brisk    Sensation:        normal sensation over shoulder and upper extremity       Radiology  Results for orders placed or performed during the hospital encounter of 12/28/18   Shoulder XR, 2 view, right    Narrative    SHOULDER TWO VIEWS RIGHT   12/28/2018 1:34 PM     HISTORY: Pain after forcefully puling on object at work earlier today.    COMPARISON: None.      Impression    IMPRESSION: Moderate acromioclavicular degenerative changes.  Glenohumeral joint is normal. No evidence of acute fracture or  subluxation.    DANIELA CESPEDES MD       Assessment:  1. Injury of right shoulder, subsequent encounter    2. Rotator cuff insufficiency of right shoulder    3. Work related injury        Plan:  Discussed the assessment with the patient.  Follow up: 2 weeks  Hopefully will get MRI approved for additional diagnostic information about possible RTC tear, based on hx and exam  XR images independently visualized and reviewed with patient today in clinic  No clear acute findings on XR, but does not evaluate RTC  [place] provided for severe, breakthrough pain, will not be an ongoing treatment, reviewed appropriate uses and potential risks  Cannot take NSAIDs due to serious allergy  Work letter provided detailing restrictions   PT ordered, can start when approved  Home handouts provided and supportive care reviewed  All questions were answered  today  Contact us with additional questions or concerns  Signs and sx of concern reviewed      Lazarus Gatica DO, CAQ  Primary Care Sports Medicine  [facility]           Disclaimer: This note consists of symbols derived from keyboarding, dictation and/or voice recognition software. As a result, there may be errors in the script that have gone undetected. Please consider this when interpreting information found in this chart.

## 2019-01-14 ENCOUNTER — HOSPITAL ENCOUNTER (OUTPATIENT)
Dept: PHYSICAL THERAPY | Facility: CLINIC | Age: 64
Setting detail: THERAPIES SERIES
End: 2019-01-14
Attending: FAMILY MEDICINE
Payer: OTHER MISCELLANEOUS
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
## 2019-01-14 DIAGNOSIS — M25.311 ROTATOR CUFF INSUFFICIENCY OF RIGHT SHOULDER: ICD-10-CM

## 2019-01-14 DIAGNOSIS — S49.91XD INJURY OF RIGHT SHOULDER, SUBSEQUENT ENCOUNTER: ICD-10-CM

## 2019-01-14 PROCEDURE — 97140 MANUAL THERAPY 1/> REGIONS: CPT | Mod: GP | Performed by: PHYSICAL THERAPIST

## 2019-01-14 PROCEDURE — 97161 PT EVAL LOW COMPLEX 20 MIN: CPT | Mod: GP | Performed by: PHYSICAL THERAPIST

## 2019-01-14 PROCEDURE — 97110 THERAPEUTIC EXERCISES: CPT | Mod: GP | Performed by: PHYSICAL THERAPIST

## 2019-01-14 NOTE — PROGRESS NOTES
01/14/19 0700   General Information   Type of Visit Initial OP Ortho PT Evaluation   Start of Care Date 01/14/19   Referring Physician Lazarus Gatica,     Patient/Family Goals Statement reduce pain   Orders Evaluate and Treat   Orders Comment work on gentle ROM progression and acute care after injury, no strengthening until further imaging completed or significant decrease in pain, work injury with possible RTC tear   Date of Order 01/03/19   Insurance Type Other   Insurance Comments/Visits Authorized    Medical Diagnosis Injury of right shoulder, subsequent encounter S49.91XD  - Primary Rotator cuff insufficiency of right shoulder M25.311 Work related injury Y99.0   Surgical/Medical history reviewed Yes   Precautions/Limitations no known precautions/limitations   Body Part(s)   Body Part(s) Shoulder   Presentation and Etiology   Pertinent history of current problem (include personal factors and/or comorbidities that impact the POC) Pt relates he had to pull cart out of truck and had sharp pain in shoulder. Pt saw MD and he is now on lifting restictions, 10 lbs but is still at work Pt is scheduled for MRI on wed and will see En on Thurs. Pt denies radicular symptoms or neck pain at this time. Pt is R handed  PMH: smoking    Impairments A. Pain   Functional Limitations perform activities of daily living;perform required work activities   Symptom Location R shoulder   How/Where did it occur At work   Onset date of current episode/exacerbation 12/28/18   Chronicity New   Pain rating (0-10 point scale) Worst (/10);Best (/10)   Best (/10) 3   Worst (/10) 8   Pain quality B. Dull;A. Sharp;D. Burning;C. Aching;G. Cramping   Frequency of pain/symptoms A. Constant   Pain/symptoms exacerbated by A. Sitting;B. Walking;D. Carrying;C. Lifting;E. Rest;G. Certain positions;H. Overhead reach;J. ADL;K. Home tasks;L. Work tasks   Pain/symptoms eased by F. Certain positions   Progression of symptoms since onset:  Unchanged   Current / Previous Interventions   Diagnostic Tests: X-ray   X-ray Results Results   X-ray results IMPRESSION: Moderate acromioclavicular degenerative changes. Glenohumeral joint is normal. No evidence of acute fracture or subluxation.   Current Level of Function   Current Community Support Family/friend caregiver   Patient role/employment history A. 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 No   Have you fallen and had an injury in the past year? No   Is patient a fall risk? No   Functional Scales   Functional Scales Other   Other Scales  SPADI: 85%   Shoulder Objective Findings   Side (if bilateral, select both right and left) Right   Posture fwd shoulder,   Cervical Screen (ROM, quadrant) WFL    Shoulder Flexibility Comments Bicep strength: 5/5    Honeycutt-Reji Test +   [place]'s Test -    Load and Shift Test -   Relocation Test -   Sulcus Test -   Crossover Test +   Shoulder Special Tests Comments speeds: -, bear hug: +    Palpation TTP RTC insetion, denies pain into arm or along medial border of scap    Right Shoulder Flexion AROM 70   Right Shoulder Flexion PROM 90   Right Shoulder Abduction AROM 40   Right Shoulder Abduction PROM 110   Right Shoulder ER AROM unable    Right Shoulder ER PROM 90   Right Shoulder IR AROM 3 in to R of S1   Right Shoulder IR PROM 0   Right Shoulder Flexion Strength 4/5    Right Shoulder Abduction Strength 4/5   Right Shoulder ER Strength 2/5 w pain   Right Shoulder IR Strength 4/5   Planned Therapy Interventions   Planned Therapy Interventions joint mobilization;manual therapy;neuromuscular re-education;ROM;strengthening;stretching   Planned Modality Interventions   Planned Modality Interventions TENS;Electrical stimulation;Cryotherapy   Clinical Impression   Criteria for  Skilled Therapeutic Interventions Met yes, treatment indicated   PT Diagnosis possible R RTC tear   Influenced by the following impairments limited ROM, weakness, pain   Functional limitations due to impairments work, ADL's   Clinical Presentation Stable/Uncomplicated   Clinical Presentation Rationale Pt is pleasant 63 yr old male w possible RTC tear. Pt is otherwise healthy and motivated to return to work    Clinical Decision Making (Complexity) Low complexity   Therapy Frequency 2 times/Week   Predicted Duration of Therapy Intervention (days/wks) 6 weeks   Risk & Benefits of therapy have been explained Yes   Patient, Family & other staff in agreement with plan of care Yes   Education Assessment   Preferred Learning Style Listening;Reading;Demonstration;Pictures/video   Barriers to Learning No barriers   ORTHO GOALS   PT Ortho Eval Goals 1;2;3;4   Ortho Goal 1   Goal Identifier ROM   Goal Description Pt will demonstrate full  GH AROM with less than 1/10 pain in order to be able to don/doff jacket/shirt to return to PLOF w/o  pain.   Target Date 02/04/19   Ortho Goal 2   Goal Identifier ROM   Goal Description Pt will demonstrate 180 deg GH AROM flex w less than 1/10 pain to allow him to reach overhead  into cupboard to put dishes away without pain   Target Date 02/04/19   Ortho Goal 3   Goal Identifier MMT   Goal Description Pt will demonstate 5/5 R UE strnegth to be able to return to work w/o restictions   Target Date 02/25/19   Ortho Goal 4   Goal Identifier SPADI   Goal Description Pt will report <10% disability on SPADI to demonstrate improved ability to complete daily activities and demonstrate significant clinical improvement   Target Date 02/25/19   Total Evaluation Time   PT Aleida, Low Complexity Minutes (64451) 15       Katharina Fermin  Physical Therapist  [address] [address]  [email or web address]   [email or web address]   Office: [phone] Fax: [phone]

## 2019-01-16 ENCOUNTER — HOSPITAL ENCOUNTER (OUTPATIENT)
Dept: MRI IMAGING | Facility: CLINIC | Age: 64
Discharge: HOME | End: 2019-01-16
Attending: FAMILY MEDICINE | Admitting: FAMILY MEDICINE
Payer: OTHER MISCELLANEOUS

## 2019-01-16 DIAGNOSIS — S49.91XD INJURY OF RIGHT SHOULDER, SUBSEQUENT ENCOUNTER: ICD-10-CM

## 2019-01-16 DIAGNOSIS — M25.311 ROTATOR CUFF INSUFFICIENCY OF RIGHT SHOULDER: ICD-10-CM
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
## 2019-01-16 PROCEDURE — 73221 MRI JOINT UPR EXTREM W/O DYE: CPT | Mod: RT

## 2019-01-17 ENCOUNTER — OFFICE VISIT (OUTPATIENT)
Dept: ORTHOPEDICS | Facility: CLINIC | Age: 64
End: 2019-01-17
Payer: OTHER MISCELLANEOUS

## 2019-01-17 VITALS
DIASTOLIC BLOOD PRESSURE: 84 MMHG | BODY MASS INDEX: 29.18 KG/M2 | WEIGHT: 197 LBS | HEIGHT: 69 IN | SYSTOLIC BLOOD PRESSURE: 125 MMHG | HEART RATE: 76 BPM

## 2019-01-17 DIAGNOSIS — M75.51 SUBACROMIAL BURSITIS OF RIGHT SHOULDER JOINT: ICD-10-CM

## 2019-01-17 DIAGNOSIS — S49.91XD INJURY OF RIGHT SHOULDER, SUBSEQUENT ENCOUNTER: Primary | ICD-10-CM
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
## 2019-01-17 PROCEDURE — 99213 OFFICE O/P EST LOW 20 MIN: CPT | Mod: 25 | Performed by: FAMILY MEDICINE

## 2019-01-17 PROCEDURE — 20611 DRAIN/INJ JOINT/BURSA W/US: CPT | Mod: RT | Performed by: FAMILY MEDICINE

## 2019-01-17 RX ORDER — TRIAMCINOLONE ACETONIDE 40 MG/ML
20 INJECTION, SUSPENSION INTRA-ARTICULAR; INTRAMUSCULAR
Status: DISCONTINUED | OUTPATIENT
Start: 2019-01-17 | End: 2021-03-30

## 2019-01-17 RX ORDER — ROPIVACAINE HYDROCHLORIDE 5 MG/ML
3 INJECTION, SOLUTION EPIDURAL; INFILTRATION; PERINEURAL
Status: DISCONTINUED | OUTPATIENT
Start: 2019-01-17 | End: 2021-03-30

## 2019-01-17 RX ADMIN — TRIAMCINOLONE ACETONIDE 20 MG: 40 INJECTION, SUSPENSION INTRA-ARTICULAR; INTRAMUSCULAR at 09:40

## 2019-01-17 RX ADMIN — ROPIVACAINE HYDROCHLORIDE 3 ML: 5 INJECTION, SOLUTION EPIDURAL; INFILTRATION; PERINEURAL at 09:40

## 2019-01-17 ASSESSMENT — MIFFLIN-ST. JEOR: SCORE: 1678.97

## 2019-01-17 NOTE — PROGRESS NOTES
"Antione Block  :  1955  DOS: 2019  MRN: [id number]    Sports Medicine Clinic Visit    PCP: No Ref-Primary, Physician    Antione Block is a 63 year old Right hand dominant male who is seen as an ER referral presenting with right shoulder injury.    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  Pain located over right posterior, lateral shoulder, nonradiating.  Additional Features:  Positive: weakness and decreased AROM.  Symptoms are better with Ice and Rest.  Symptoms are worse with: shoulder flexion/abduction, lifting, lying on right shoulder.  Other evaluation and/or treatments so far consists of: Ice, Ibuprofen, Rest and ER visit.  Recent imaging completed: X-rays completed 18.  Prior History of related problems: none    Interim history 2019:  Overall continues to have significant pain, but overall better than last visit, ROM improving.  Had one visit with PT which was helpful, though was sore after.  Had MRI done of right shoulder.  No new injuries.  Believes he may be able to return to driving.    Social History: works as  for [employer]     Review of Systems  Musculoskeletal: as above  Remainder of review of systems is negative including constitutional, CV, pulmonary, GI, Skin and Neurologic except as noted in HPI or medical history.    History reviewed. No pertinent past medical history.  History reviewed. No pertinent surgical history.    Objective  /84 (BP Location: Left arm, Patient Position: Chair, Cuff Size: Adult Regular)   Pulse 76   Ht 1.753 m (5' 9\")   Wt 89.4 kg (197 lb)   BMI 29.09 kg/m      General: healthy, alert and in no distress    HEENT: no scleral icterus or conjunctival erythema   Skin: no suspicious lesions or rash. No jaundice.   CV: regular rhythm by palpation, 2+ distal pulses, no pedal edema  "   Resp: normal respiratory effort without conversational dyspnea   Psych: normal mood and affect    Gait: nonantalgic, appropriate coordination and balance   Neuro: normal light touch sensory exam of the extremities. Motor strength as noted below     Right Shoulder exam    ROM:        forward flexion 1300        abduction 1200       internal rotation SI joint       external rotation 50       All motions decreased but improving       asymmetric scapular motion on R    Tender:        subacromial space        posterior shoulder    Non Tender:       remainder of shoulder       sternoclavicular joint       periscapular region    Strength:        abduction 4/5       internal rotation 5/5       external rotation 4/5       adduction 5/5    Impingement testing:        positive (+) Neer       positive (+) Honeycutt       positive (+) empty can       neg (-) crossover    Stability testing:       neg (-) anterior glide       neg (-) sulcus sign    Skin:       no visible deformities       well perfused       capillary refill brisk    Sensation:        normal sensation over shoulder and upper extremity       Radiology  Recent Results (from the past 744 hour(s))   Shoulder XR, 2 view, right    Narrative    SHOULDER TWO VIEWS RIGHT   12/28/2018 1:34 PM     HISTORY: Pain after forcefully puling on object at work earlier today.    COMPARISON: None.      Impression    IMPRESSION: Moderate acromioclavicular degenerative changes.  Glenohumeral joint is normal. No evidence of acute fracture or  subluxation.    DANIELA CESPEDES MD   MR Shoulder Right w/o Contrast    Narrative    MR SHOULDER RIGHT WITHOUT CONTRAST 1/16/2019 9:24 AM     HISTORY: Shoulder pain, rotator cuff tear/impingement suspected.  Evaluate for RTC tear after work injury. Injury of right shoulder,  subsequent encounter. Rotator cuff insufficiency of right shoulder.  Work related injury.    TECHNIQUE: Axial dual echo T2. Coronal T1. Coronal T2 with and without  fat suppression.  Sagittal T2.    FINDINGS:  Osseous Acromion Outlet: There is active AC arthrosis, including  moderate inferior hypertrophic change. Type 1-2 acromion. No os  acromiale.    Rotator Cuff: Supraspinatus tendon - moderate tendinosis, with no tear  identified. Infraspinatus tendon - moderate tendinosis. Subscapularis  tendon - mild tendinosis. Intact teres minor tendon. There is  prominent teres minor muscle atrophy. This can be seen with  quadrilateral space syndrome or axillary nerve injury.     Labral Structures: No superior labral tear (SLAP lesion) identified.  No labral cyst identified. No anterior or posterior labral tear  identified.    Biceps Tendon: Mild-moderate tendinosis in the rotator interval and at  the genu.    Osseous and Cartilaginous Structures: Minimal resorptive change of the  humeral head. No bone contusion or fracture. No glenohumeral  osteoarthritis or apparent chondromalacia identified.    Joint Space: Small amount of joint fluid without significant overall  joint effusion.    Additional Findings: No deltoid muscle edema. Edema within the  subacromial-subdeltoid bursa.      Impression    IMPRESSION:  1. Moderate supraspinatus and infraspinatus tendinosis. Subacromial  bursal edema.  2. Mild subscapularis tendinosis.  3. Prominent teres minor muscle atrophy, which can be seen with  quadrilateral space syndrome or axillary nerve injury.  4. Mild-moderate biceps tendinosis.    MILLY FRITZ MD     Large Joint Injection/Arthocentesis: R glenohumeral joint  Date/Time: 1/17/2019 9:40 AM  Performed by: Lazarus Gatica DO  Authorized by: Lazarus Gatica DO     Indications:  Pain  Needle Size:  21 G  Guidance: ultrasound    Approach:  Anterolateral  Location:  Shoulder  Site:  R glenohumeral joint  Medications:  20 mg triamcinolone 40 MG/ML; 3 mL ropivacaine 5 MG/ML  Outcome:  Tolerated well, no immediate complications  Procedure discussed: discussed risks, benefits, and alternatives     Consent Given by:  Patient  Timeout: timeout called immediately prior to procedure    Prep: patient was prepped and draped in usual sterile fashion            Assessment:  1. Injury of right shoulder, subsequent encounter    2. Work related injury    3. Subacromial bursitis of right shoulder joint        Plan:  Discussed the assessment with the patient.  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, otherwise same functional restrictions for now  Cannot take NSAIDs due to serious allergy  Work letter provided detailing restrictions   PT ordered, can continue after 1 week  Trial of US guided CSI today to subacromial space to decrease pain and hopefully hasten recovery  Expectations and goals of CSI reviewed  Often 2-3 days for steroid effect, and can take up to two weeks for maximum effect  We discussed modified progressive pain-free activity as tolerated  Do not overuse in first two weeks if feeling better due to concern for vulnerability while steroid is working  Supportive care reviewed  All questions were answered today  Contact us with additional questions or concerns  Signs and sx of concern reviewed      Lazarus Gatica DO, HUEY  Primary Care Sports Medicine  [facility]         Disclaimer: This note consists of symbols derived from keyboarding, dictation and/or voice recognition software. As a result, there may be errors in the script that have gone undetected. Please consider this when interpreting information found in this chart.

## 2019-01-17 NOTE — LETTER
January 17, 2019      Antione was seen in my office today for follow up of a right shoulder injury on 12/28/2018.  He has improving but ongoing significant pain and limitations of strength and ROM in his right shoulder.  His MRI was reassuring in that it did not demonstrate a significant rotator cuff tear.  He will continue with physical therapy.  We will try a cortisone injection today to try to control inflammation and hasten recovery.  I still do not recommend the use of his right arm for overhead activity, lift/carry more than 10 pounds, and push/pull more than 25 pounds.  Driving his work vehicle is likely safe at this point, pending verification from his supervisor/safety team.  I will follow up with him in 2-3 weeks.  His restrictions should be in place for the next 4 weeks for now.  Updated recommendations will be provided at his next appointment, sooner if needed.      Lazarus Pool DO, HUEY  Primary Care Sports Medicine  [facility]

## 2019-01-17 NOTE — LETTER
"    2019         RE: Antione Block  [address]  [facility] [address]        Dear Colleague,    Thank you for referring your patient, Antione Block, to the [facility]. Please see a copy of my visit note below.    Antione Block  :  1955  DOS: 2019  MRN: [id number]    Sports Medicine Clinic Visit    PCP: No Ref-Primary, Physician    Antione Block is a 63 year old Right hand dominant male who is seen as an ER referral presenting with right shoulder injury.    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  Pain located over right posterior, lateral shoulder, nonradiating.  Additional Features:  Positive: weakness and decreased AROM.  Symptoms are better with Ice and Rest.  Symptoms are worse with: shoulder flexion/abduction, lifting, lying on right shoulder.  Other evaluation and/or treatments so far consists of: Ice, Ibuprofen, Rest and ER visit.  Recent imaging completed: X-rays completed 18.  Prior History of related problems: none    Interim history 2019:  Overall continues to have significant pain, but overall better than last visit, ROM improving.  Had one visit with PT which was helpful, though was sore after.  Had MRI done of right shoulder.  No new injuries.  Believes he may be able to return to driving.    Social History: works as  for [employer]     Review of Systems  Musculoskeletal: as above  Remainder of review of systems is negative including constitutional, CV, pulmonary, GI, Skin and Neurologic except as noted in HPI or medical history.    History reviewed. No pertinent past medical history.  History reviewed. No pertinent surgical history.    Objective  /84 (BP Location: Left arm, Patient Position: Chair, Cuff Size: Adult Regular)   Pulse 76   Ht 1.753 m (5' " "9\")   Wt 89.4 kg (197 lb)   BMI 29.09 kg/m       General: healthy, alert and in no distress    HEENT: no scleral icterus or conjunctival erythema   Skin: no suspicious lesions or rash. No jaundice.   CV: regular rhythm by palpation, 2+ distal pulses, no pedal edema    Resp: normal respiratory effort without conversational dyspnea   Psych: normal mood and affect    Gait: nonantalgic, appropriate coordination and balance   Neuro: normal light touch sensory exam of the extremities. Motor strength as noted below     Right Shoulder exam    ROM:        forward flexion 1300        abduction 1200       internal rotation SI joint       external rotation 50       All motions decreased but improving       asymmetric scapular motion on R    Tender:        subacromial space        posterior shoulder    Non Tender:       remainder of shoulder       sternoclavicular joint       periscapular region    Strength:        abduction 4/5       internal rotation 5/5       external rotation 4/5       adduction 5/5    Impingement testing:        positive (+) Neer       positive (+) Honeycutt       positive (+) empty can       neg (-) crossover    Stability testing:       neg (-) anterior glide       neg (-) sulcus sign    Skin:       no visible deformities       well perfused       capillary refill brisk    Sensation:        normal sensation over shoulder and upper extremity       Radiology  Recent Results (from the past 744 hour(s))   Shoulder XR, 2 view, right    Narrative    SHOULDER TWO VIEWS RIGHT   12/28/2018 1:34 PM     HISTORY: Pain after forcefully puling on object at work earlier today.    COMPARISON: None.      Impression    IMPRESSION: Moderate acromioclavicular degenerative changes.  Glenohumeral joint is normal. No evidence of acute fracture or  subluxation.    DANIELA CESPEDES MD   MR Shoulder Right w/o Contrast    Narrative    MR SHOULDER RIGHT WITHOUT CONTRAST 1/16/2019 9:24 AM     HISTORY: Shoulder pain, rotator cuff " tear/impingement suspected.  Evaluate for RTC tear after work injury. Injury of right shoulder,  subsequent encounter. Rotator cuff insufficiency of right shoulder.  Work related injury.    TECHNIQUE: Axial dual echo T2. Coronal T1. Coronal T2 with and without  fat suppression. Sagittal T2.    FINDINGS:  Osseous Acromion Outlet: There is active AC arthrosis, including  moderate inferior hypertrophic change. Type 1-2 acromion. No os  acromiale.    Rotator Cuff: Supraspinatus tendon - moderate tendinosis, with no tear  identified. Infraspinatus tendon - moderate tendinosis. Subscapularis  tendon - mild tendinosis. Intact teres minor tendon. There is  prominent teres minor muscle atrophy. This can be seen with  quadrilateral space syndrome or axillary nerve injury.     Labral Structures: No superior labral tear (SLAP lesion) identified.  No labral cyst identified. No anterior or posterior labral tear  identified.    Biceps Tendon: Mild-moderate tendinosis in the rotator interval and at  the genu.    Osseous and Cartilaginous Structures: Minimal resorptive change of the  humeral head. No bone contusion or fracture. No glenohumeral  osteoarthritis or apparent chondromalacia identified.    Joint Space: Small amount of joint fluid without significant overall  joint effusion.    Additional Findings: No deltoid muscle edema. Edema within the  subacromial-subdeltoid bursa.      Impression    IMPRESSION:  1. Moderate supraspinatus and infraspinatus tendinosis. Subacromial  bursal edema.  2. Mild subscapularis tendinosis.  3. Prominent teres minor muscle atrophy, which can be seen with  quadrilateral space syndrome or axillary nerve injury.  4. Mild-moderate biceps tendinosis.    MILLY FRITZ MD     Large Joint Injection/Arthocentesis: R glenohumeral joint  Date/Time: 1/17/2019 9:40 AM  Performed by: Lazarus Gatica DO  Authorized by: Lazarus Gatica DO     Indications:  Pain  Needle Size:  21 G  Guidance:  ultrasound    Approach:  Anterolateral  Location:  Shoulder  Site:  R glenohumeral joint  Medications:  20 mg triamcinolone 40 MG/ML; 3 mL ropivacaine 5 MG/ML  Outcome:  Tolerated well, no immediate complications  Procedure discussed: discussed risks, benefits, and alternatives    Consent Given by:  Patient  Timeout: timeout called immediately prior to procedure    Prep: patient was prepped and draped in usual sterile fashion            Assessment:  1. Injury of right shoulder, subsequent encounter    2. Work related injury    3. Subacromial bursitis of right shoulder joint        Plan:  Discussed the assessment with the patient.  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, otherwise same functional restrictions for now  Cannot take NSAIDs due to serious allergy  Work letter provided detailing restrictions   PT ordered, can continue after 1 week  Trial of US guided CSI today to subacromial space to decrease pain and hopefully hasten recovery  Expectations and goals of CSI reviewed  Often 2-3 days for steroid effect, and can take up to two weeks for maximum effect  We discussed modified progressive pain-free activity as tolerated  Do not overuse in first two weeks if feeling better due to concern for vulnerability while steroid is working  Supportive care reviewed  All questions were answered today  Contact us with additional questions or concerns  Signs and sx of concern reviewed      Lazarus Gatica DO, HUEY  Primary Care Sports Medicine  [facility]         Disclaimer: This note consists of symbols derived from keyboarding, dictation and/or voice recognition software. As a result, there may be errors in the script that have gone undetected. Please consider this when interpreting information found in this chart.    Again, thank you for  allowing me to participate in the care of your patient.        Sincerely,        Lazarus Gatica, DO

## 2019-01-17 NOTE — NURSING NOTE
"Chief Complaint   Patient presents with     RECHECK     right shoulder strain       Initial /84 (BP Location: Left arm, Patient Position: Chair, Cuff Size: Adult Regular)   Pulse 76   Ht 1.753 m (5' 9\")   Wt 89.4 kg (197 lb)   BMI 29.09 kg/m   Estimated body mass index is 29.09 kg/m  as calculated from the following:    Height as of this encounter: 1.753 m (5' 9\").    Weight as of this encounter: 89.4 kg (197 lb).  Medications and allergies reviewed.    Juliana ANTONY CMA    "

## 2019-01-30 ENCOUNTER — HOSPITAL ENCOUNTER (OUTPATIENT)
Dept: PHYSICAL THERAPY | Facility: CLINIC | Age: 64
Setting detail: THERAPIES SERIES
End: 2019-01-30
Attending: FAMILY MEDICINE
Payer: OTHER MISCELLANEOUS

## 2019-01-30 ENCOUNTER — TELEPHONE (OUTPATIENT)
Dept: ORTHOPEDICS | Facility: CLINIC | Age: 64
End: 2019-01-30

## 2019-01-30 PROCEDURE — 97110 THERAPEUTIC EXERCISES: CPT | Mod: GP | Performed by: PHYSICAL THERAPIST

## 2019-01-30 NOTE — TELEPHONE ENCOUNTER
Received message from patient's physical therapy that patient is doing well and wishing to return to work w/o restrictions.  Discussed with Dr Gatica would be ok for patient to RTW without restrictions at this time.  Dr Gatica will be out of clinic on 1/31, appointment needs to be cancelled, will hold on rescheduling at this time unless patient is having further concerns.    LVM with detailed information regarding above info and need to cx 1/31 appointment.  Letter pended.    oDminik Bella ATC

## 2019-01-30 NOTE — LETTER
January 30, 2019      Antione was seen in my office today for follow up of a right shoulder injury on 12/28/2018.  He has improving but ongoing significant pain and limitations of strength and ROM in his right shoulder.  He is doing well with physical therapy and following his steroid injection on 1/17/19.  He may return to work at this without restriction at this time.  He will follow up with me in clinic as needed.        Lazarus Pool DO, CAQ  Primary Care Sports Medicine  [facility]

## 2019-01-30 NOTE — PROGRESS NOTES
Outpatient Physical Therapy Discharge Note     Patient: Antione Block  : 1955    Beginning/End Dates of Reporting Period:  19 to 2019    Referring Provider: Dr. Gatica    Therapy Diagnosis: possible R RTC tear     Client Self Report: Pt had MRI and is not torn. Pt had injection and per MD note, held PT 1 week.  Pt relates since injection pt fells like he is back to 100% and ready to go back to work. Pt relates no concerns. Pt will see Dr Gatica tomorrow     Objective Measurements:  Objective Measure: R Shoulder PROM  Details: NT    Objective Measure: R shoulder AROM  Details: flex: 190 abd: 180 IR:T10  ER: C7    Objective Measure: R shoulder MMT  Details: flex: 5/5 abd: 5/5 ER: 4+/5  IR:5/5           Outcome Measures (most recent score):  SPADI: 0%    Goals:  Goal Identifier ROM   Goal Description Pt will demonstrate full  GH AROM with less than 1/10 pain in order to be able to don/doff jacket/shirt to return to PLOF w/o  pain.   Target Date 19   Date Met   19   Progress:goal met     Goal Identifier ROM   Goal Description Pt will demonstrate 180 deg GH AROM flex w less than 1/10 pain to allow him to reach overhead  into cupboard to put dishes away without pain   Target Date 19   Date Met   19   Progress:goal met     Goal Identifier MMT   Goal Description Pt will demonstate 5/5 R UE strnegth to be able to return to work w/o restictions   Target Date 19   Date Met      Progress:min weakness noted in ER     Goal Identifier SPADI   Goal Description Pt will report <10% disability on SPADI to demonstrate improved ability to complete daily activities and demonstrate significant clinical improvement   Target Date 19   Date Met   19   Progress:goal met        Progress Toward Goals:   Progress this reporting period: Antione was seen in PT 2x and is doing great after cortisone injection. He has full ROM and good strength. Very min weakness is noted in R shoulder ER. He was  able to complete 50 lbs lift from floor to counter and 20 lbs lift overhead w good form and no pain. Pt relates he feels he is ready to return to work. Pt was set him up on HEP for continued strengthening and will hold chart open 30 days to allow pt to return to work full duty. If pain returns or issues arise, pt is to return to PT. If pt does not return in that time, he will be discharge to HEP        Plan:  Discharge from therapy.    Discharge:    Reason for Discharge: Patient has met all goals.    Equipment Issued: NA    Discharge Plan: Patient to continue home program.    Katharina Fermin  Physical Therapist  [facility]  [address] [place] [id number]  [email or web address]   [email or web address]   Office: [phone] Fax: [phone]

## 2019-02-07 ENCOUNTER — OFFICE VISIT (OUTPATIENT)
Dept: ORTHOPEDICS | Facility: CLINIC | Age: 64
End: 2019-02-07
Payer: OTHER MISCELLANEOUS

## 2019-02-07 VITALS
SYSTOLIC BLOOD PRESSURE: 143 MMHG | DIASTOLIC BLOOD PRESSURE: 87 MMHG | BODY MASS INDEX: 29.47 KG/M2 | WEIGHT: 199 LBS | HEIGHT: 69 IN
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
## 2019-02-07 DIAGNOSIS — M75.51 SUBACROMIAL BURSITIS OF RIGHT SHOULDER JOINT: ICD-10-CM

## 2019-02-07 DIAGNOSIS — S49.91XD INJURY OF RIGHT SHOULDER, SUBSEQUENT ENCOUNTER: ICD-10-CM

## 2019-02-07 PROCEDURE — 99213 OFFICE O/P EST LOW 20 MIN: CPT | Performed by: FAMILY MEDICINE

## 2019-02-07 ASSESSMENT — MIFFLIN-ST. JEOR: SCORE: 1688.04

## 2019-02-07 NOTE — LETTER
February 7, 2019      Antione was seen in my office today for follow up of a right shoulder injury on 12/28/2018.  He has improved significantly and is ready to return to work full time without restrictions.  He has improved with cortisone injection and physical therapy.  He will continue with home exercises as needed.  Updated recommendations will be provided if needed only.  His case should remain open for another 6 weeks.  If he has not followed up and not had a return of pain during that time, he will be considered at I without partial or permanent disability.       Lazarus Pool, , CAQ  Primary Care Sports Medicine  [facility]

## 2019-02-07 NOTE — LETTER
"    2019         RE: Antione Block  [address]  [facility] [address]        Dear Colleague,    Thank you for referring your patient, Antione Block, to the [facility]. Please see a copy of my visit note below.    Antione Block  :  1955  DOS: 19  MRN: [id number]    Sports Medicine Clinic Visit    PCP: No Ref-Primary, Physician    Antione Block is a 63 year old Right hand dominant male who is seen as an ER referral presenting with right shoulder injury.    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  Pain located over right posterior, lateral shoulder, nonradiating.  Additional Features:  Positive: weakness and decreased AROM.  Symptoms are better with Ice and Rest.  Symptoms are worse with: shoulder flexion/abduction, lifting, lying on right shoulder.  Other evaluation and/or treatments so far consists of: Ice, Ibuprofen, Rest and ER visit.  Recent imaging completed: X-rays completed 18.  Prior History of related problems: none    Interim history 2019:  Overall continues to have significant pain, but  better than last visit, ROM improving.  Had one visit with PT which was helpful, though was sore after.  Had MRI done of right shoulder.  No new injuries.  Believes he may be able to return to driving.    Social History: works as  for [employer]     Interim History - 2019  Since last visit on 2019 patient has minimal right shoulder pain.  Right shoulder subacromial injection completed on  provided excellent relief. Patient has continued physical therapy with good relief, has been released for HEP only at this time.  Eager to return to full work duty.  No new injury in the interim.    Review of Systems  Musculoskeletal: as above  Remainder of review of systems is " "negative including constitutional, CV, pulmonary, GI, Skin and Neurologic except as noted in HPI or medical history.    No past medical history on file.  No past surgical history on file.    Objective  /87   Ht 1.753 m (5' 9\")   Wt 90.3 kg (199 lb)   BMI 29.39 kg/m       General: healthy, alert and in no distress    HEENT: no scleral icterus or conjunctival erythema   Skin: no suspicious lesions or rash. No jaundice.   CV: regular rhythm by palpation, 2+ distal pulses, no pedal edema    Resp: normal respiratory effort without conversational dyspnea   Psych: normal mood and affect    Gait: nonantalgic, appropriate coordination and balance   Neuro: normal light touch sensory exam of the extremities. Motor strength as noted below     Right Shoulder exam    ROM:        forward flexion 150       abduction 160       internal rotation symmetric       external rotation symmetric       All motions improved    Tender:        subacromial space        posterior shoulder    Non Tender:       remainder of shoulder       sternoclavicular joint       periscapular region    Strength:        abduction 5/5       internal rotation 5/5       external rotation 5/5       adduction 5/5    Impingement testing:        Negative Neer       Negative Honeycutt       Neg empty can and crank       neg (-) crossover    Stability testing:       neg (-) anterior glide       neg (-) sulcus sign    Skin:       no visible deformities       well perfused       capillary refill brisk    Sensation:        normal sensation over shoulder and upper extremity       Radiology  Recent Results (from the past 744 hour(s))   Shoulder XR, 2 view, right    Narrative    SHOULDER TWO VIEWS RIGHT   12/28/2018 1:34 PM     HISTORY: Pain after forcefully puling on object at work earlier today.    COMPARISON: None.      Impression    IMPRESSION: Moderate acromioclavicular degenerative changes.  Glenohumeral joint is normal. No evidence of acute fracture " or  subluxation.    DANIELA CESPEDES MD   MR Shoulder Right w/o Contrast    Narrative    MR SHOULDER RIGHT WITHOUT CONTRAST 1/16/2019 9:24 AM     HISTORY: Shoulder pain, rotator cuff tear/impingement suspected.  Evaluate for RTC tear after work injury. Injury of right shoulder,  subsequent encounter. Rotator cuff insufficiency of right shoulder.  Work related injury.    TECHNIQUE: Axial dual echo T2. Coronal T1. Coronal T2 with and without  fat suppression. Sagittal T2.    FINDINGS:  Osseous Acromion Outlet: There is active AC arthrosis, including  moderate inferior hypertrophic change. Type 1-2 acromion. No os  acromiale.    Rotator Cuff: Supraspinatus tendon - moderate tendinosis, with no tear  identified. Infraspinatus tendon - moderate tendinosis. Subscapularis  tendon - mild tendinosis. Intact teres minor tendon. There is  prominent teres minor muscle atrophy. This can be seen with  quadrilateral space syndrome or axillary nerve injury.     Labral Structures: No superior labral tear (SLAP lesion) identified.  No labral cyst identified. No anterior or posterior labral tear  identified.    Biceps Tendon: Mild-moderate tendinosis in the rotator interval and at  the genu.    Osseous and Cartilaginous Structures: Minimal resorptive change of the  humeral head. No bone contusion or fracture. No glenohumeral  osteoarthritis or apparent chondromalacia identified.    Joint Space: Small amount of joint fluid without significant overall  joint effusion.    Additional Findings: No deltoid muscle edema. Edema within the  subacromial-subdeltoid bursa.      Impression    IMPRESSION:  1. Moderate supraspinatus and infraspinatus tendinosis. Subacromial  bursal edema.  2. Mild subscapularis tendinosis.  3. Prominent teres minor muscle atrophy, which can be seen with  quadrilateral space syndrome or axillary nerve injury.  4. Mild-moderate biceps tendinosis.    MILLY FRITZ MD     Assessment:  1. Injury of right shoulder, subsequent  encounter    2. Work related injury    3. Subacromial bursitis of right shoulder joint        Plan:  Discussed the assessment with the patient.  Follow up: within 6 weeks prn  XR and MR images independently visualized and reviewed with patient again today in clinic  Reassuring no RTC tear, bursitis findings reviewed  Work letter updated, no restrictions  PT completed, continue HEP  US guided CSI today to subacromial space was very helpful  F/u with be prn only  Will assume MMI with not permanent restriction or disability if no f/u with in 6 weeks  Supportive care reviewed  All questions were answered today  Contact us with additional questions or concerns  Signs and sx of concern reviewed      Lazarus Gatica DO, HUEY  Primary Care Sports Medicine  [facility]         Disclaimer: This note consists of symbols derived from keyboarding, dictation and/or voice recognition software. As a result, there may be errors in the script that have gone undetected. Please consider this when interpreting information found in this chart.    Again, thank you for allowing me to participate in the care of your patient.        Sincerely,        Lazarus Gatica DO

## 2019-02-07 NOTE — PROGRESS NOTES
"Antione Block  :  1955  DOS: 19  MRN: [id number]    Sports Medicine Clinic Visit    PCP: No Ref-Primary, Physician    Antione Block is a 63 year old Right hand dominant male who is seen as an ER referral presenting with right shoulder injury.    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  Pain located over right posterior, lateral shoulder, nonradiating.  Additional Features:  Positive: weakness and decreased AROM.  Symptoms are better with Ice and Rest.  Symptoms are worse with: shoulder flexion/abduction, lifting, lying on right shoulder.  Other evaluation and/or treatments so far consists of: Ice, Ibuprofen, Rest and ER visit.  Recent imaging completed: X-rays completed 18.  Prior History of related problems: none    Interim history 2019:  Overall continues to have significant pain, but  better than last visit, ROM improving.  Had one visit with PT which was helpful, though was sore after.  Had MRI done of right shoulder.  No new injuries.  Believes he may be able to return to driving.    Social History: works as  for [email or web address]     Interim History - 2019  Since last visit on 2019 patient has minimal right shoulder pain.  Right shoulder subacromial injection completed on  provided excellent relief. Patient has continued physical therapy with good relief, has been released for HEP only at this time.  Eager to return to full work duty.  No new injury in the interim.    Review of Systems  Musculoskeletal: as above  Remainder of review of systems is negative including constitutional, CV, pulmonary, GI, Skin and Neurologic except as noted in HPI or medical history.    No past medical history on file.  No past surgical history on file.    Objective  /87   Ht 1.753 m (5' 9\")   Wt 90.3 kg (199 lb)   BMI 29.39 " kg/m      General: healthy, alert and in no distress    HEENT: no scleral icterus or conjunctival erythema   Skin: no suspicious lesions or rash. No jaundice.   CV: regular rhythm by palpation, 2+ distal pulses, no pedal edema    Resp: normal respiratory effort without conversational dyspnea   Psych: normal mood and affect    Gait: nonantalgic, appropriate coordination and balance   Neuro: normal light touch sensory exam of the extremities. Motor strength as noted below     Right Shoulder exam    ROM:        forward flexion 150       abduction 160       internal rotation symmetric       external rotation symmetric       All motions improved    Tender:        subacromial space        posterior shoulder    Non Tender:       remainder of shoulder       sternoclavicular joint       periscapular region    Strength:        abduction 5/5       internal rotation 5/5       external rotation 5/5       adduction 5/5    Impingement testing:        Negative Neer       Negative Honeycutt       Neg empty can and crank       neg (-) crossover    Stability testing:       neg (-) anterior glide       neg (-) sulcus sign    Skin:       no visible deformities       well perfused       capillary refill brisk    Sensation:        normal sensation over shoulder and upper extremity       Radiology  Recent Results (from the past 744 hour(s))   Shoulder XR, 2 view, right    Narrative    SHOULDER TWO VIEWS RIGHT   12/28/2018 1:34 PM     HISTORY: Pain after forcefully puling on object at work earlier today.    COMPARISON: None.      Impression    IMPRESSION: Moderate acromioclavicular degenerative changes.  Glenohumeral joint is normal. No evidence of acute fracture or  subluxation.    DANIELA CESPEDES MD   MR Shoulder Right w/o Contrast    Narrative    MR SHOULDER RIGHT WITHOUT CONTRAST 1/16/2019 9:24 AM     HISTORY: Shoulder pain, rotator cuff tear/impingement suspected.  Evaluate for RTC tear after work injury. Injury of right  shoulder,  subsequent encounter. Rotator cuff insufficiency of right shoulder.  Work related injury.    TECHNIQUE: Axial dual echo T2. Coronal T1. Coronal T2 with and without  fat suppression. Sagittal T2.    FINDINGS:  Osseous Acromion Outlet: There is active AC arthrosis, including  moderate inferior hypertrophic change. Type 1-2 acromion. No os  acromiale.    Rotator Cuff: Supraspinatus tendon - moderate tendinosis, with no tear  identified. Infraspinatus tendon - moderate tendinosis. Subscapularis  tendon - mild tendinosis. Intact teres minor tendon. There is  prominent teres minor muscle atrophy. This can be seen with  quadrilateral space syndrome or axillary nerve injury.     Labral Structures: No superior labral tear (SLAP lesion) identified.  No labral cyst identified. No anterior or posterior labral tear  identified.    Biceps Tendon: Mild-moderate tendinosis in the rotator interval and at  the genu.    Osseous and Cartilaginous Structures: Minimal resorptive change of the  humeral head. No bone contusion or fracture. No glenohumeral  osteoarthritis or apparent chondromalacia identified.    Joint Space: Small amount of joint fluid without significant overall  joint effusion.    Additional Findings: No deltoid muscle edema. Edema within the  subacromial-subdeltoid bursa.      Impression    IMPRESSION:  1. Moderate supraspinatus and infraspinatus tendinosis. Subacromial  bursal edema.  2. Mild subscapularis tendinosis.  3. Prominent teres minor muscle atrophy, which can be seen with  quadrilateral space syndrome or axillary nerve injury.  4. Mild-moderate biceps tendinosis.    MILLY FRITZ MD     Assessment:  1. Injury of right shoulder, subsequent encounter    2. Work related injury    3. Subacromial bursitis of right shoulder joint        Plan:  Discussed the assessment with the patient.  Follow up: within 6 weeks prn  XR and MR images independently visualized and reviewed with patient again today in  clinic  Reassuring no RTC tear, bursitis findings reviewed  Work letter updated, no restrictions  PT completed, continue HEP  US guided CSI today to subacromial space was very helpful  F/u with be prn only  Will assume MMI with not permanent restriction or disability if no f/u with in 6 weeks  Supportive care reviewed  All questions were answered today  Contact us with additional questions or concerns  Signs and sx of concern reviewed      Lazarus Gatica DO, CAARLENE  Primary Care Sports Medicine  [facility]         Disclaimer: This note consists of symbols derived from keyboarding, dictation and/or voice recognition software. As a result, there may be errors in the script that have gone undetected. Please consider this when interpreting information found in this chart.

## 2019-04-11 ENCOUNTER — TELEPHONE (OUTPATIENT)
Dept: ORTHOPEDICS | Facility: CLINIC | Age: 64
End: 2019-04-11

## 2019-04-11 NOTE — TELEPHONE ENCOUNTER
Reason for Call:  Form, our goal is to have forms completed with 7 days, however, some forms may require a visit or additional information.    Type of letter, form or note:  WC - Health Care Provider Report    Who is the form from?:  - [facility]    Where did the form come from: Patient or family brought in       Phone number of person requesting form:   Can we leave a detailed message on this number:      Desired completion date of form: ASAP      How will form be returned?:  mailed to [facility] in envelope provided    Has the patient signed a consent form for release of information (may be included with form)? YES    Additional comments: Per letter written on 2/7/19 patient to follow up in 6 weeks.  Will place at I if patient has not followed up by that time.    Form was started and place in Provider Basket for provider review/ completion at [facility].     Dominik Bella ATC

## 2020-07-14 ENCOUNTER — OFFICE VISIT (OUTPATIENT)
Dept: FAMILY MEDICINE | Facility: CLINIC | Age: 65
End: 2020-07-14
Payer: COMMERCIAL

## 2020-07-14 VITALS
HEIGHT: 69 IN | OXYGEN SATURATION: 93 % | HEART RATE: 76 BPM | TEMPERATURE: 98 F | RESPIRATION RATE: 16 BRPM | SYSTOLIC BLOOD PRESSURE: 118 MMHG | BODY MASS INDEX: 29.03 KG/M2 | WEIGHT: 196 LBS | DIASTOLIC BLOOD PRESSURE: 70 MMHG
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
## 2020-07-14 DIAGNOSIS — Z23 NEED FOR VACCINATION: ICD-10-CM

## 2020-07-14 PROCEDURE — 90471 IMMUNIZATION ADMIN: CPT | Performed by: NURSE PRACTITIONER

## 2020-07-14 PROCEDURE — 99212 OFFICE O/P EST SF 10 MIN: CPT | Mod: 25 | Performed by: NURSE PRACTITIONER

## 2020-07-14 PROCEDURE — 90714 TD VACC NO PRESV 7 YRS+ IM: CPT | Performed by: NURSE PRACTITIONER

## 2020-07-14 RX ORDER — MULTIVIT WITH MINERALS/LUTEIN
1 TABLET ORAL DAILY
COMMUNITY

## 2020-07-14 RX ORDER — MULTIVIT-MIN/IRON/FOLIC ACID/K 18-600-40
CAPSULE ORAL
COMMUNITY

## 2020-07-14 RX ORDER — ASCORBIC ACID 100 MG
TABLET,CHEWABLE ORAL
COMMUNITY

## 2020-07-14 ASSESSMENT — MIFFLIN-ST. JEOR: SCORE: 1656.49

## 2020-07-14 ASSESSMENT — ENCOUNTER SYMPTOMS
FEVER: 0
CHILLS: 0
MYALGIAS: 0
CONSTITUTIONAL NEGATIVE: 1

## 2020-07-14 NOTE — PROGRESS NOTES
"Subjective     Antione Block is a 65 year old male who presents to clinic today for the following health issues:    HPI   Tick Bite   Onset: noticed them 07/10/20, leg was itching 07/09/20.    Description:   Twp possible tick bite on back of left leg behind knee. Patient states it was itchy the first 3-4 days. Now pants are rubbing on it and irritating it.     Intensity: mild    Progression of Symptoms:  improving    Accompanying Signs & Symptoms:  No     Previous history of similar problem:   No     Precipitating factors:   Worsened by: Pants rubbing on bite    Alleviating factors:  Improved by: benadryl cream     Therapies Tried and outcome: Benadryl cream and triple ointment on it the first couple days.    Additional provider notes: ~1 week ago had itching and noticed 2 areas with surrounding erythema. Never had attached tick that he is aware of. Wife is concerned and wanted him seen, but patient states it is improving.         Reviewed and updated as needed this visit by Provider  Tobacco  Allergies  Meds  Problems  Med Hx  Surg Hx  Fam Hx         Review of Systems   Constitutional: Negative.  Negative for chills and fever.   Musculoskeletal: Negative for myalgias.   Skin: Positive for rash (2 areas of erythema, improving).            Objective    /70   Pulse 76   Temp 98  F (36.7  C) (Tympanic)   Resp 16   Ht 1.74 m (5' 8.5\")   Wt 88.9 kg (196 lb)   SpO2 93%   BMI 29.37 kg/m    Body mass index is 29.37 kg/m .  Physical Exam  Vitals signs and nursing note reviewed.   Constitutional:       General: He is not in acute distress.     Appearance: Normal appearance. He is not ill-appearing or toxic-appearing.   Skin:     General: Skin is warm and dry.          Neurological:      Mental Status: He is alert.            Diagnostic Test Results:  Labs reviewed in Epic        Assessment & Plan     1. Tick bite, initial encounter  Improving. No embedded tick. Minimal risk factors for Lyme disease. " "Offered blood test, educated that it was early and may not show anything yet. Patient declined stating he was not concerned, it was his wife that made him come in. Discussed s/s that would warrant further evaluation. Handout provided.    2. Need for vaccination    - 1st  Administration  [72228]  - TD PRSERV FREE >=7 YRS ADS IM [99812]     Tobacco Cessation:   reports that he has been smoking cigarettes. He has a 10.75 pack-year smoking history. He has never used smokeless tobacco.        BMI:   Estimated body mass index is 29.37 kg/m  as calculated from the following:    Height as of this encounter: 1.74 m (5' 8.5\").    Weight as of this encounter: 88.9 kg (196 lb).           See Patient Instructions    Return if symptoms worsen or fail to improve.    SUMA Marinelli [facility]      "

## 2020-07-14 NOTE — PATIENT INSTRUCTIONS
"Watch for signs of fatigue and muscle aches. If you start developing signs of Lyme disease, please follow-up for blood work and possible antibiotic treatment.      Patient Education     Tick Bites  Ticks are small arachnids that feed on the blood of rodents, rabbits, birds, deer, dogs, and people. A tick bite may cause a reaction like a spider bite. You may have redness, itching, and slight swelling at the site. Sometimes you may have no reaction where the tick bit you.  Ticks may gorge themselves for days before you find and remove them. The bites themselves aren't cause for concern. But ticks can carry and pass on illnesses such as Lyme disease and [place] spotted fever. Both diseases begin with a rash and symptoms similar to the flu. In advanced stages, these diseases can be quite serious.     A \"bull's eye\" rash is a common symptom of Lyme disease.   When to go to the emergency room (ER)  Not all ticks carry disease. And a tick must stay attached for at least 24 hours to infect you. If you find a tick, don't panic. Try to carefully remove it with tweezers. Grasp the tick near its head and pull without twisting. If you can't easily dislodge the tick or if you leave the head in your skin, get medical care right away.  What to expect in the ER    The tick or any parts of the tick will be removed and the bite will be cleaned.    To prevent disease, you may be given antibiotics. Both Lyme disease and [place] spotted fever respond quickly to these medicines.    You may be asked to see your healthcare provider for a blood test to check for Lyme disease.  Follow-up care  Some states and [facility] have services that test ticks for Lyme disease and other diseases. Check with your local officials to see if this service is available in your area.  If you remove a tick yourself, watch for signs of a tick-borne illness. Symptoms may show up within a few days or weeks after a bite. Call your healthcare provider if " you notice any of the following:    Rash. The rash may spread outward in a ring from a hard white lump. Or it may move up your arms and legs to your chest.    Chills and fever    Body aches and joint pain    Severe headache  Date Last Reviewed: 12/1/2016 2000-2019 [employer] [address] [place] [address]. All rights reserved. This information is not intended as a substitute for professional medical care. Always follow your healthcare professional's instructions.

## 2020-07-14 NOTE — NURSING NOTE
Prior to immunization administration, verified patients identity using patient s name and date of birth. Please see Immunization Activity for additional information.     Screening Questionnaire for Adult Immunization    Are you sick today?   No   Do you have allergies to medications, food, a vaccine component or latex?   No   Have you ever had a serious reaction after receiving a vaccination?   No   Do you have a long-term health problem with heart, lung, kidney, or metabolic disease (e.g., diabetes), asthma, a blood disorder, no spleen, complement component deficiency, a cochlear implant, or a spinal fluid leak?  Are you on long-term aspirin therapy?   No   Do you have cancer, leukemia, HIV/AIDS, or any other immune system problem?   No   Do you have a parent, brother, or sister with an immune system problem?   No   In the past 3 months, have you taken medications that affect  your immune system, such as prednisone, other steroids, or anticancer drugs; drugs for the treatment of rheumatoid arthritis, Crohn s disease, or psoriasis; or have you had radiation treatments?   No   Have you had a seizure, or a brain or other nervous system problem?   No   During the past year, have you received a transfusion of blood or blood    products, or been given immune (gamma) globulin or antiviral drug?   No   For women: Are you pregnant or is there a chance you could become       pregnant during the next month?   No   Have you received any vaccinations in the past 4 weeks?   No     Immunization questionnaire answers were all negative.        Per orders of Dr. Alves, injection of Td given by Eli Guan CMA. Patient instructed to remain in clinic for 15 minutes afterwards, and to report any adverse reaction to me immediately.       Screening performed by Eli Guan CMA on 7/14/2020 at 7:52 AM.

## 2021-03-30 ENCOUNTER — OFFICE VISIT (OUTPATIENT)
Dept: FAMILY MEDICINE | Facility: CLINIC | Age: 66
End: 2021-03-30
Payer: COMMERCIAL

## 2021-03-30 VITALS
RESPIRATION RATE: 14 BRPM | HEIGHT: 69 IN | OXYGEN SATURATION: 96 % | BODY MASS INDEX: 29.62 KG/M2 | DIASTOLIC BLOOD PRESSURE: 70 MMHG | HEART RATE: 82 BPM | SYSTOLIC BLOOD PRESSURE: 128 MMHG | WEIGHT: 200 LBS | TEMPERATURE: 97 F

## 2021-03-30 DIAGNOSIS — R73.01 ELEVATED FASTING BLOOD SUGAR: ICD-10-CM

## 2021-03-30 DIAGNOSIS — F17.200 SMOKER: ICD-10-CM

## 2021-03-30 DIAGNOSIS — Z13.0 SCREENING FOR DEFICIENCY ANEMIA: ICD-10-CM

## 2021-03-30 DIAGNOSIS — Z13.220 SCREENING FOR HYPERLIPIDEMIA: ICD-10-CM
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
## 2021-03-30 DIAGNOSIS — Z82.49 FAMILY HISTORY OF ISCHEMIC HEART DISEASE: ICD-10-CM

## 2021-03-30 LAB
ANION GAP SERPL CALCULATED.3IONS-SCNC: 4 MMOL/L (ref 3–14)
BUN SERPL-MCNC: 18 MG/DL (ref 7–30)
CALCIUM SERPL-MCNC: 9.1 MG/DL (ref 8.5–10.1)
CHLORIDE SERPL-SCNC: 108 MMOL/L (ref 94–109)
CHOLEST SERPL-MCNC: 220 MG/DL
CO2 SERPL-SCNC: 24 MMOL/L (ref 20–32)
CREAT SERPL-MCNC: 0.97 MG/DL (ref 0.66–1.25)
GFR SERPL CREATININE-BSD FRML MDRD: 81 ML/MIN/{1.73_M2}
GLUCOSE SERPL-MCNC: 136 MG/DL (ref 70–99)
HBA1C MFR BLD: 6.3 % (ref 0–5.6)
HDLC SERPL-MCNC: 43 MG/DL
HGB BLD-MCNC: 17.3 G/DL (ref 13.3–17.7)
LDLC SERPL CALC-MCNC: 140 MG/DL
NONHDLC SERPL-MCNC: 177 MG/DL
POTASSIUM SERPL-SCNC: 4.3 MMOL/L (ref 3.4–5.3)
SODIUM SERPL-SCNC: 136 MMOL/L (ref 133–144)
TRIGL SERPL-MCNC: 183 MG/DL

## 2021-03-30 PROCEDURE — 85018 HEMOGLOBIN: CPT | Performed by: NURSE PRACTITIONER

## 2021-03-30 PROCEDURE — 99214 OFFICE O/P EST MOD 30 MIN: CPT | Performed by: NURSE PRACTITIONER

## 2021-03-30 PROCEDURE — 80048 BASIC METABOLIC PNL TOTAL CA: CPT | Performed by: NURSE PRACTITIONER

## 2021-03-30 PROCEDURE — 83036 HEMOGLOBIN GLYCOSYLATED A1C: CPT | Performed by: NURSE PRACTITIONER

## 2021-03-30 PROCEDURE — 80061 LIPID PANEL: CPT | Performed by: NURSE PRACTITIONER

## 2021-03-30 PROCEDURE — 36415 COLL VENOUS BLD VENIPUNCTURE: CPT | Performed by: NURSE PRACTITIONER

## 2021-03-30 RX ORDER — OMEGA-3/DHA/EPA/FISH OIL 300-1000MG
CAPSULE,DELAYED RELEASE (ENTERIC COATED) ORAL
COMMUNITY

## 2021-03-30 RX ORDER — VIT C/B6/B5/MAGNESIUM/HERB 173 50-5-6-5MG
CAPSULE ORAL DAILY
COMMUNITY

## 2021-03-30 ASSESSMENT — PAIN SCALES - GENERAL: PAINLEVEL: NO PAIN (0)

## 2021-03-30 ASSESSMENT — MIFFLIN-ST. JEOR: SCORE: 1674.63

## 2021-03-30 NOTE — LETTER
[facility]  [address]  [place] [address]  Phone: [phone]       March 30, 2021         Antione Block  [address]  [place] [address]            Dear Antione:    Below are your recent lab results:    Component      Latest Ref Rng & Units 3/30/2021   Sodium      133 - 144 mmol/L 136   Potassium      3.4 - 5.3 mmol/L 4.3   Chloride      94 - 109 mmol/L 108   Carbon Dioxide      20 - 32 mmol/L 24   Anion Gap      3 - 14 mmol/L 4   Glucose      70 - 99 mg/dL 136 (H)   Urea Nitrogen      7 - 30 mg/dL 18   Creatinine      0.66 - 1.25 mg/dL 0.97   GFR Estimate      >60 mL/min/1.73:m2 81   GFR Estimate If Black      >60 mL/min/1.73:m2 >90   Calcium      8.5 - 10.1 mg/dL 9.1   Cholesterol      <200 mg/dL 220 (H)   Triglycerides      <150 mg/dL 183 (H)   HDL Cholesterol      >39 mg/dL 43   LDL Cholesterol Calculated      <100 mg/dL 140 (H)   Non HDL Cholesterol      <130 mg/dL 177 (H)   Hemoglobin      13.3 - 17.7 g/dL 17.3   Hemoglobin A1C      0 - 5.6 % 6.3 (H)       Thank you,      Emiliana Dinh, APRN JOVI/ Minerva Kolb RN

## 2021-03-30 NOTE — LETTER
March 30, 2021      Antione Block  [address]  [facility] [address]        To Whom It May Concern:    Antione Block was seen in our clinic. He may return to work without restrictions. No acute illness or chronic illness was identified to impair his ability to do his job.       Sincerely,          SUMA Verdin CNP
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
## 2021-03-30 NOTE — PROGRESS NOTES
Letters to return to work printed. Letter with lab results printed. Patient notified and letters walked up to the . Patient will pick them up in the morning.  Minerva Kolb RN

## 2021-03-31 ENCOUNTER — TELEPHONE (OUTPATIENT)
Dept: FAMILY MEDICINE | Facility: CLINIC | Age: 66
End: 2021-03-31

## 2021-03-31 NOTE — TELEPHONE ENCOUNTER
Discussed losing wt ie: increase veg/ fruit and whole grains.  Decrease fats,salt and sugar ie frozen meals, sugar drinks, deli foods,canned and boxed foods.  Increase activity ie: walking 2x/day.    Alicia

## 2021-03-31 NOTE — TELEPHONE ENCOUNTER
"Reason for Call:  Other diet    Detailed comments: pt is asking about a result he got yesterday and the \"improving overall diet\" pt would like specifics to how his diet should be    Phone Number Patient can be reached at: Home number on file [phone]    Best Time: any    Can we leave a detailed message on this number? YES    Call taken on 3/31/2021 at 10:33 AM by Cassy Cunha      "

## 2021-06-14 ENCOUNTER — OFFICE VISIT (OUTPATIENT)
Dept: FAMILY MEDICINE | Facility: CLINIC | Age: 66
End: 2021-06-14
Payer: COMMERCIAL

## 2021-06-14 ENCOUNTER — APPOINTMENT (OUTPATIENT)
Dept: CT IMAGING | Facility: CLINIC | Age: 66
End: 2021-06-14
Attending: FAMILY MEDICINE
Payer: COMMERCIAL

## 2021-06-14 ENCOUNTER — HOSPITAL ENCOUNTER (EMERGENCY)
Facility: CLINIC | Age: 66
Discharge: ANOTHER HOSPITAL | End: 2021-06-14
Attending: FAMILY MEDICINE | Admitting: FAMILY MEDICINE
Payer: COMMERCIAL

## 2021-06-14 ENCOUNTER — APPOINTMENT (OUTPATIENT)
Dept: MRI IMAGING | Facility: CLINIC | Age: 66
End: 2021-06-14
Attending: FAMILY MEDICINE
Payer: COMMERCIAL

## 2021-06-14 VITALS
DIASTOLIC BLOOD PRESSURE: 64 MMHG | WEIGHT: 190 LBS | TEMPERATURE: 101.5 F | HEART RATE: 103 BPM | SYSTOLIC BLOOD PRESSURE: 116 MMHG | OXYGEN SATURATION: 94 % | BODY MASS INDEX: 28.47 KG/M2 | RESPIRATION RATE: 22 BRPM

## 2021-06-14 VITALS
RESPIRATION RATE: 18 BRPM | HEART RATE: 92 BPM | BODY MASS INDEX: 28.79 KG/M2 | OXYGEN SATURATION: 92 % | TEMPERATURE: 100.3 F | WEIGHT: 190 LBS | HEIGHT: 68 IN | DIASTOLIC BLOOD PRESSURE: 74 MMHG | SYSTOLIC BLOOD PRESSURE: 132 MMHG

## 2021-06-14 DIAGNOSIS — N41.0 PROSTATITIS, ACUTE: ICD-10-CM

## 2021-06-14 DIAGNOSIS — R50.9 FEVER, UNSPECIFIED FEVER CAUSE: ICD-10-CM

## 2021-06-14 DIAGNOSIS — R32 URINARY INCONTINENCE, UNSPECIFIED TYPE: Primary | ICD-10-CM

## 2021-06-14 LAB
ALBUMIN SERPL-MCNC: 3.4 G/DL (ref 3.4–5)
ALBUMIN UR-MCNC: NEGATIVE MG/DL
ALBUMIN UR-MCNC: NEGATIVE MG/DL
ALP SERPL-CCNC: 91 U/L (ref 40–150)
ALT SERPL W P-5'-P-CCNC: 20 U/L (ref 0–70)
ANION GAP SERPL CALCULATED.3IONS-SCNC: 7 MMOL/L (ref 3–14)
APPEARANCE UR: CLEAR
APPEARANCE UR: CLEAR
AST SERPL W P-5'-P-CCNC: 12 U/L (ref 0–45)
BACTERIA #/AREA URNS HPF: ABNORMAL /HPF
BASOPHILS # BLD AUTO: 0.1 10E9/L (ref 0–0.2)
BASOPHILS NFR BLD AUTO: 0.4 %
BILIRUB SERPL-MCNC: 0.7 MG/DL (ref 0.2–1.3)
BILIRUB UR QL STRIP: NEGATIVE
BILIRUB UR QL STRIP: NEGATIVE
BUN SERPL-MCNC: 13 MG/DL (ref 7–30)
CALCIUM SERPL-MCNC: 8.5 MG/DL (ref 8.5–10.1)
CHLORIDE SERPL-SCNC: 104 MMOL/L (ref 94–109)
CO2 SERPL-SCNC: 24 MMOL/L (ref 20–32)
COLOR UR AUTO: YELLOW
COLOR UR AUTO: YELLOW
CREAT SERPL-MCNC: 1.18 MG/DL (ref 0.66–1.25)
CRP SERPL-MCNC: 198 MG/L (ref 0–8)
DIFFERENTIAL METHOD BLD: ABNORMAL
EOSINOPHIL # BLD AUTO: 0 10E9/L (ref 0–0.7)
EOSINOPHIL NFR BLD AUTO: 0 %
ERYTHROCYTE [DISTWIDTH] IN BLOOD BY AUTOMATED COUNT: 12.7 % (ref 10–15)
GFR SERPL CREATININE-BSD FRML MDRD: 64 ML/MIN/{1.73_M2}
GLUCOSE SERPL-MCNC: 124 MG/DL (ref 70–99)
GLUCOSE UR STRIP-MCNC: NEGATIVE MG/DL
GLUCOSE UR STRIP-MCNC: NEGATIVE MG/DL
HCT VFR BLD AUTO: 47.3 % (ref 40–53)
HGB BLD-MCNC: 15.9 G/DL (ref 13.3–17.7)
HGB UR QL STRIP: NEGATIVE
HGB UR QL STRIP: NEGATIVE
IMM GRANULOCYTES # BLD: 0.2 10E9/L (ref 0–0.4)
IMM GRANULOCYTES NFR BLD: 0.7 %
KETONES UR STRIP-MCNC: NEGATIVE MG/DL
KETONES UR STRIP-MCNC: NEGATIVE MG/DL
LABORATORY COMMENT REPORT: NORMAL
LACTATE BLD-SCNC: 1 MMOL/L (ref 0.7–2)
LEUKOCYTE ESTERASE UR QL STRIP: ABNORMAL
LEUKOCYTE ESTERASE UR QL STRIP: ABNORMAL
LIPASE SERPL-CCNC: 37 U/L (ref 73–393)
LYMPHOCYTES # BLD AUTO: 1.7 10E9/L (ref 0.8–5.3)
LYMPHOCYTES NFR BLD AUTO: 6.9 %
MCH RBC QN AUTO: 30.3 PG (ref 26.5–33)
MCHC RBC AUTO-ENTMCNC: 33.6 G/DL (ref 31.5–36.5)
MCV RBC AUTO: 90 FL (ref 78–100)
MONOCYTES # BLD AUTO: 1.4 10E9/L (ref 0–1.3)
MONOCYTES NFR BLD AUTO: 5.6 %
NEUTROPHILS # BLD AUTO: 21.4 10E9/L (ref 1.6–8.3)
NEUTROPHILS NFR BLD AUTO: 86.4 %
NITRATE UR QL: NEGATIVE
NITRATE UR QL: NEGATIVE
NON-SQ EPI CELLS #/AREA URNS LPF: ABNORMAL /LPF
NRBC # BLD AUTO: 0 10*3/UL
NRBC BLD AUTO-RTO: 0 /100
PH UR STRIP: 5.5 PH (ref 5–7)
PH UR STRIP: 6 PH (ref 5–7)
PLATELET # BLD AUTO: 225 10E9/L (ref 150–450)
POTASSIUM SERPL-SCNC: 4.4 MMOL/L (ref 3.4–5.3)
PROT SERPL-MCNC: 7.3 G/DL (ref 6.8–8.8)
RBC # BLD AUTO: 5.25 10E12/L (ref 4.4–5.9)
RBC #/AREA URNS AUTO: 3 /HPF (ref 0–2)
RBC #/AREA URNS AUTO: ABNORMAL /HPF
SARS-COV-2 RNA RESP QL NAA+PROBE: NEGATIVE
SODIUM SERPL-SCNC: 135 MMOL/L (ref 133–144)
SOURCE: ABNORMAL
SOURCE: ABNORMAL
SP GR UR STRIP: 1.01 (ref 1–1.03)
SP GR UR STRIP: <=1.005 (ref 1–1.03)
SPECIMEN SOURCE: NORMAL
UROBILINOGEN UR STRIP-ACNC: 0.2 EU/DL (ref 0.2–1)
UROBILINOGEN UR STRIP-MCNC: 0 MG/DL (ref 0–2)
WBC # BLD AUTO: 24.8 10E9/L (ref 4–11)
WBC #/AREA URNS AUTO: 10 /HPF (ref 0–5)
WBC #/AREA URNS AUTO: ABNORMAL /HPF

## 2021-06-14 PROCEDURE — 96374 THER/PROPH/DIAG INJ IV PUSH: CPT | Mod: 59 | Performed by: FAMILY MEDICINE

## 2021-06-14 PROCEDURE — 83605 ASSAY OF LACTIC ACID: CPT | Performed by: FAMILY MEDICINE

## 2021-06-14 PROCEDURE — 81001 URINALYSIS AUTO W/SCOPE: CPT | Performed by: FAMILY MEDICINE

## 2021-06-14 PROCEDURE — 87040 BLOOD CULTURE FOR BACTERIA: CPT | Performed by: FAMILY MEDICINE

## 2021-06-14 PROCEDURE — 83690 ASSAY OF LIPASE: CPT | Performed by: FAMILY MEDICINE

## 2021-06-14 PROCEDURE — 258N000003 HC RX IP 258 OP 636: Performed by: FAMILY MEDICINE

## 2021-06-14 PROCEDURE — 74177 CT ABD & PELVIS W/CONTRAST: CPT

## 2021-06-14 PROCEDURE — 96375 TX/PRO/DX INJ NEW DRUG ADDON: CPT | Performed by: FAMILY MEDICINE

## 2021-06-14 PROCEDURE — 250N000011 HC RX IP 250 OP 636: Performed by: FAMILY MEDICINE

## 2021-06-14 PROCEDURE — 87635 SARS-COV-2 COVID-19 AMP PRB: CPT | Performed by: FAMILY MEDICINE

## 2021-06-14 PROCEDURE — 51702 INSERT TEMP BLADDER CATH: CPT | Performed by: FAMILY MEDICINE

## 2021-06-14 PROCEDURE — C9803 HOPD COVID-19 SPEC COLLECT: HCPCS | Performed by: FAMILY MEDICINE

## 2021-06-14 PROCEDURE — 72148 MRI LUMBAR SPINE W/O DYE: CPT

## 2021-06-14 PROCEDURE — 99285 EMERGENCY DEPT VISIT HI MDM: CPT | Mod: 25 | Performed by: FAMILY MEDICINE

## 2021-06-14 PROCEDURE — 80053 COMPREHEN METABOLIC PANEL: CPT | Performed by: FAMILY MEDICINE

## 2021-06-14 PROCEDURE — 99215 OFFICE O/P EST HI 40 MIN: CPT | Performed by: FAMILY MEDICINE

## 2021-06-14 PROCEDURE — 87086 URINE CULTURE/COLONY COUNT: CPT | Performed by: FAMILY MEDICINE

## 2021-06-14 PROCEDURE — 250N000009 HC RX 250: Performed by: FAMILY MEDICINE

## 2021-06-14 PROCEDURE — 85025 COMPLETE CBC W/AUTO DIFF WBC: CPT | Performed by: FAMILY MEDICINE

## 2021-06-14 PROCEDURE — 99285 EMERGENCY DEPT VISIT HI MDM: CPT | Performed by: FAMILY MEDICINE

## 2021-06-14 PROCEDURE — 86140 C-REACTIVE PROTEIN: CPT | Performed by: FAMILY MEDICINE

## 2021-06-14 RX ORDER — SODIUM CHLORIDE, SODIUM LACTATE, POTASSIUM CHLORIDE, CALCIUM CHLORIDE 600; 310; 30; 20 MG/100ML; MG/100ML; MG/100ML; MG/100ML
INJECTION, SOLUTION INTRAVENOUS CONTINUOUS
Status: DISCONTINUED | OUTPATIENT
Start: 2021-06-14 | End: 2021-06-15 | Stop reason: HOSPADM

## 2021-06-14 RX ORDER — CEFTRIAXONE SODIUM 1 G/50ML
1 INJECTION, SOLUTION INTRAVENOUS ONCE
Status: COMPLETED | OUTPATIENT
Start: 2021-06-14 | End: 2021-06-14

## 2021-06-14 RX ORDER — HYDROMORPHONE HYDROCHLORIDE 1 MG/ML
0.5 INJECTION, SOLUTION INTRAMUSCULAR; INTRAVENOUS; SUBCUTANEOUS ONCE
Status: COMPLETED | OUTPATIENT
Start: 2021-06-14 | End: 2021-06-14

## 2021-06-14 RX ORDER — IOPAMIDOL 755 MG/ML
93 INJECTION, SOLUTION INTRAVASCULAR ONCE
Status: COMPLETED | OUTPATIENT
Start: 2021-06-14 | End: 2021-06-14

## 2021-06-14 RX ADMIN — SODIUM CHLORIDE, POTASSIUM CHLORIDE, SODIUM LACTATE AND CALCIUM CHLORIDE 1000 ML: 600; 310; 30; 20 INJECTION, SOLUTION INTRAVENOUS at 19:02

## 2021-06-14 RX ADMIN — HYDROMORPHONE HYDROCHLORIDE 0.5 MG: 1 INJECTION, SOLUTION INTRAMUSCULAR; INTRAVENOUS; SUBCUTANEOUS at 22:05

## 2021-06-14 RX ADMIN — CEFTRIAXONE SODIUM 1 G: 1 INJECTION, SOLUTION INTRAVENOUS at 21:04

## 2021-06-14 RX ADMIN — SODIUM CHLORIDE 63 ML: 9 INJECTION, SOLUTION INTRAVENOUS at 20:01

## 2021-06-14 RX ADMIN — IOPAMIDOL 93 ML: 755 INJECTION, SOLUTION INTRAVENOUS at 20:00

## 2021-06-14 ASSESSMENT — MIFFLIN-ST. JEOR: SCORE: 1616.33

## 2021-06-14 NOTE — ED PROVIDER NOTES
History     Chief Complaint   Patient presents with     Urinary Retention     sent from Dr. Parker's office for acute urinary retention.  he is febrile.      HPI  Antione Block is a 66 year old male, past medical history is significant for tobacco use disorder, presents today with concerns of difficulty urinating from his primary care provider's office.  History is obtained from the patient and his wife notifies.  The patient states that beginning around noon or 1:00 yesterday he has had insensate loss of urine, often with transitioning from lying or sitting down to standing up.  He had for 5 episodes while seated in his car today on the way to see his primary care provider.  He normally has 2 bowel movements a day and has not defecated in 2 days.  He denied any abdominal pain initially to me however he does identify the onset of low back pain without trauma or injury suspected at exactly the same time as the onset of the insensate loss of urine beginning around noon 1:00 yesterday.  He notes no paresthesias he notes no radicular symptoms down the legs.  He has not had any back issues in the past.  He has not tried any pain medication of any kind for his symptoms.  He has been eating and drinking normally of late.  He felt some chills earlier in the day, he was found to be febrile at presentation in clinic and has a temperature of 100.7 in the emergency department with borderline resting tachycardia and no hypotension.  He does note some slight burning with urination but nothing really severe.  No urgency.  No foul-smelling or cloudy urine and no blood.      Allergies:  Allergies   Allergen Reactions     Aleve [Naproxen] Anaphylaxis       Problem List:    Patient Active Problem List    Diagnosis Date Noted     Tobacco use disorder 02/02/2015     Priority: Medium     CARDIOVASCULAR SCREENING; LDL GOAL LESS THAN 130 10/31/2010     Priority: Medium        Past Medical History:    No past medical history on  "file.    Past Surgical History:    No past surgical history on file.    Family History:    Family History   Problem Relation Age of Onset     Cancer Mother         bone and lung     Hypertension Father      Diabetes Father      Cardiovascular Brother      Heart Disease Brother      Diabetes Brother      Heart Disease Brother        Social History:  Marital Status:   [2]  Social History     Tobacco Use     Smoking status: Current Every Day Smoker     Packs/day: 0.50     Years: 43.00     Pack years: 21.50     Types: Cigarettes     Smokeless tobacco: Never Used   Substance Use Topics     Alcohol use: Not Currently     Comment: rare use      Drug use: No        Medications:    Ascorbic Acid (VITAMIN C) 100 MG CHEW  aspirin 81 MG tablet  Cholecalciferol (VITAMIN D) 50 MCG (2000 UT) CAPS  EPINEPHrine 0.3 MG/0.3ML injection  Garlic 10 MG CAPS  multivitamin (CENTRUM SILVER) tablet  Omega-3 Fatty Acids (OMEGA-3 FISH OIL EX ST) 880 MG CAPS  Turmeric 500 MG CAPS          Review of Systems   All other systems reviewed and are negative.      Physical Exam   BP: 139/74  Pulse: 101  Temp: 100.7  F (38.2  C)  Resp: 18  Height: 172.7 cm (5' 8\")  Weight: 86.2 kg (190 lb)  SpO2: 95 %      Physical Exam  Vitals signs and nursing note reviewed.   Constitutional:       General: He is not in acute distress.     Appearance: Normal appearance. He is normal weight. He is not ill-appearing.   HENT:      Head: Normocephalic and atraumatic.      Right Ear: Tympanic membrane normal.      Left Ear: Tympanic membrane normal.      Nose: Nose normal.      Mouth/Throat:      Mouth: Mucous membranes are dry.      Pharynx: Oropharynx is clear.   Eyes:      Extraocular Movements: Extraocular movements intact.      Conjunctiva/sclera: Conjunctivae normal.      Pupils: Pupils are equal, round, and reactive to light.   Neck:      Musculoskeletal: Normal range of motion and neck supple.   Cardiovascular:      Rate and Rhythm: Normal rate and regular " rhythm.      Pulses: Normal pulses.      Heart sounds: Normal heart sounds.   Pulmonary:      Effort: Pulmonary effort is normal.      Breath sounds: Normal breath sounds.   Abdominal:      General: Bowel sounds are normal.      Palpations: Abdomen is soft.   Genitourinary:     Penis: Normal.       Testes: Normal.      Comments: Perianal sensation is intact, anal wink.  Rectal exam revealed normal tone, his prostate was large and very tender to palpation, boggy.  Much more tender than any previous rectal exam that he has had.  Musculoskeletal: Normal range of motion.   Skin:     General: Skin is warm and dry.      Capillary Refill: Capillary refill takes less than 2 seconds.   Neurological:      General: No focal deficit present.      Mental Status: He is alert and oriented to person, place, and time.   Psychiatric:         Mood and Affect: Mood normal.         Behavior: Behavior normal.     Informal bedside ultrasound the patient has a full bladder.  He has no sensation of fullness.  I plan to catheterize him with a Riley catheter and leave the catheter in place throughout the diagnostic work-up here.    ED Course        Procedures               Critical Care time:  none               Results for orders placed or performed during the hospital encounter of 06/14/21 (from the past 24 hour(s))   Lactic acid whole blood   Result Value Ref Range    Lactic Acid 1.0 0.7 - 2.0 mmol/L   CBC with platelets differential   Result Value Ref Range    WBC 24.8 (H) 4.0 - 11.0 10e9/L    RBC Count 5.25 4.4 - 5.9 10e12/L    Hemoglobin 15.9 13.3 - 17.7 g/dL    Hematocrit 47.3 40.0 - 53.0 %    MCV 90 78 - 100 fl    MCH 30.3 26.5 - 33.0 pg    MCHC 33.6 31.5 - 36.5 g/dL    RDW 12.7 10.0 - 15.0 %    Platelet Count 225 150 - 450 10e9/L    Diff Method Automated Method     % Neutrophils 86.4 %    % Lymphocytes 6.9 %    % Monocytes 5.6 %    % Eosinophils 0.0 %    % Basophils 0.4 %    % Immature Granulocytes 0.7 %    Nucleated RBCs 0 0 /100     Absolute Neutrophil 21.4 (H) 1.6 - 8.3 10e9/L    Absolute Lymphocytes 1.7 0.8 - 5.3 10e9/L    Absolute Monocytes 1.4 (H) 0.0 - 1.3 10e9/L    Absolute Eosinophils 0.0 0.0 - 0.7 10e9/L    Absolute Basophils 0.1 0.0 - 0.2 10e9/L    Abs Immature Granulocytes 0.2 0 - 0.4 10e9/L    Absolute Nucleated RBC 0.0    CRP inflammation   Result Value Ref Range    CRP Inflammation 198.0 (H) 0.0 - 8.0 mg/L   Comprehensive metabolic panel   Result Value Ref Range    Sodium 135 133 - 144 mmol/L    Potassium 4.4 3.4 - 5.3 mmol/L    Chloride 104 94 - 109 mmol/L    Carbon Dioxide 24 20 - 32 mmol/L    Anion Gap 7 3 - 14 mmol/L    Glucose 124 (H) 70 - 99 mg/dL    Urea Nitrogen 13 7 - 30 mg/dL    Creatinine 1.18 0.66 - 1.25 mg/dL    GFR Estimate 64 >60 mL/min/[1.73_m2]    GFR Estimate If Black 74 >60 mL/min/[1.73_m2]    Calcium 8.5 8.5 - 10.1 mg/dL    Bilirubin Total 0.7 0.2 - 1.3 mg/dL    Albumin 3.4 3.4 - 5.0 g/dL    Protein Total 7.3 6.8 - 8.8 g/dL    Alkaline Phosphatase 91 40 - 150 U/L    ALT 20 0 - 70 U/L    AST 12 0 - 45 U/L   Lipase   Result Value Ref Range    Lipase 37 (L) 73 - 393 U/L   UA with Microscopic reflex to Culture    Specimen: Catheterized Urine   Result Value Ref Range    Color Urine Yellow     Appearance Urine Clear     Glucose Urine Negative NEG^Negative mg/dL    Bilirubin Urine Negative NEG^Negative    Ketones Urine Negative NEG^Negative mg/dL    Specific Gravity Urine 1.012 1.003 - 1.035    Blood Urine Negative NEG^Negative    pH Urine 6.0 5.0 - 7.0 pH    Protein Albumin Urine Negative NEG^Negative mg/dL    Urobilinogen mg/dL 0.0 0.0 - 2.0 mg/dL    Nitrite Urine Negative NEG^Negative    Leukocyte Esterase Urine Small (A) NEG^Negative    Source Catheterized Urine     WBC Urine 10 (H) 0 - 5 /HPF    RBC Urine 3 (H) 0 - 2 /HPF   MR Lumbar Spine w/o Contrast    Narrative    MRI LUMBAR SPINE WITHOUT CONTRAST   6/14/2021 7:48 PM     HISTORY: Acute onset low back pain with urinary incontinence and fecal  retention.  Evaluate for cauda equina.     TECHNIQUE: Multiplanar multisequence MRI of the lumbar spine without  contrast.     COMPARISON: CT of the abdomen and pelvis dated 6/14/2021.     FINDINGS: Nomenclature is based on 5 lumbar vertebral bodies. Chronic  bilateral L5 pars defects are present. There is grade 1  anterolisthesis of L5 on S1 measuring approximate 6-7 mm. Minimal  degenerative retrolisthesis of L2 on L3 measuring 1-2 mm. Sagittal  alignment is otherwise normal. Modic type II degenerative endplate  signal changes at L5-S1 and to a lesser degree anteriorly at L2-L3.  Small T2/STIR hyperintense lesion that also appears to have some  intrinsic T1 hyperintense signal in the right aspect of the L2  vertebral body (series 5 image 6) nonspecific, but probably  representing an atypical intraosseous hemangioma. Normal appearance of  the distal spinal cord with the conus terminating at L2. Visualized  aspects of the paraspinal soft tissues and visualized bony pelvis  appear unremarkable.    Segmental analysis:  T12-L1: Normal disc height and signal. No herniation. Normal facets.  No spinal canal or neural foraminal stenosis.    L1-L2: Disc desiccation with mild disc height loss. Shallow symmetric  disc bulge. Normal facets. No spinal canal or neural foraminal  stenosis.    L2-L3: Disc desiccation with mild disc height loss. Symmetric disc  bulge with posterior annular fissuring. Normal facets. Mild bilateral  lateral recess encroachment with contact of the ventral margins of the  traversing L3 nerve roots, but no nerve root displacement or overt  compression. No significant central spinal canal or neural foraminal  stenosis.    L3-L4: Mild disc desiccation without disc height loss. Shallow  symmetric disc bulge. Mild right facet arthropathy. No significant  spinal canal or neural foraminal stenosis.    L4-L5: Mild disc desiccation without disc height loss. Symmetric disc  bulge. Mild to moderate bilateral facet  arthropathy. No significant  spinal canal stenosis. Mild-to-moderate left neural foraminal stenosis  and mild right neural foraminal stenosis.    L5-S1: Moderate disc height loss. Uncovering of the posterior aspect  of the disc related to grade 1 anterolisthesis of L5 on S1 in the  setting of bilateral pars defects of L5. Symmetric disc bulge with  posterior annular fissuring. Posterolateral endplate osteophytes. Mild  to moderate facet arthropathy. No spinal canal stenosis. Moderate to  severe bilateral neural foraminal stenosis with some degree of mass  effect upon the exiting bilateral L5 nerve roots.      Impression    IMPRESSION:  1. Bilateral chronic L5 pars defects with grade 1 anterolisthesis of  L5 on S1.  2. Multilevel degenerative changes, as described.  3. No high-grade spinal canal stenosis.  4. Moderate to severe bilateral L5-S1 neural foraminal stenosis. Mild  to moderate left and mild right L4-L5 neural foraminal stenosis.   CT Abdomen Pelvis w Contrast    Narrative    EXAM: CT ABDOMEN PELVIS W CONTRAST  LOCATION: [facility]  DATE/TIME: 6/14/2021 8:02 PM    INDICATION: Abdominal pain, low back pain, incontinence of urine, fecal retention.  COMPARISON: None.  TECHNIQUE: CT scan of the abdomen and pelvis was performed following injection of IV contrast. Multiplanar reformats were obtained. Dose reduction techniques were used.  CONTRAST: 93 ml Isovbue 370    FINDINGS:   LOWER CHEST: Normal.    HEPATOBILIARY: Normal.    PANCREAS: Normal.    SPLEEN: Normal.    ADRENAL GLANDS: Normal.    KIDNEYS/BLADDER: No renal calculi or hydronephrosis. Riley catheter in the bladder.    BOWEL: No evidence for bowel obstruction. Moderate volume of stool right colon, otherwise no significant constipation.    LYMPH NODES: Normal.    VASCULATURE: Atherosclerotic disease abdominal aorta and iliac arteries.    PELVIC ORGANS: Prostatic hypertrophy.    MUSCULOSKELETAL: Small fat-containing umbilical hernia and  left inguinal hernia. Advanced degenerative disc disease L5 level. Bilateral pars defects with with low-grade anterior spondylolisthesis of L5 on the sacrum.       Impression    IMPRESSION:   1.  Prostatic hypertrophy.  2.  Degenerative changes lower lumbar spine.   3.  No other findings to account for the patient's symptoms.   9:36 PM  Patient was sleeping comfortably when I entered the room, his pain is minimal, I reviewed all diagnostic test performed here today.  Fortunately were not seeing any evidence for a neurologic phenomenon leading to urinary incontinence or fecal retention.  His CT reveals prostatic hypertrophy, degenerative lower lumbar spine changes, no other acute findings to explain his presentation.  Urine shows some evidence of infection and his systemic white count is markedly elevated as is his CRP.  His rectal exam was revealing for a tender enlarged boggy prostate concerning for prostatitis.  I suspect based upon his evaluation that his reason for presentation today is acute prostatitis.  I recommended admission.  We have no beds.    9:57 PM  I discussed this patient with the hospitalist at [facility]  who is able to accept the patient to the care of his service and agreed to accept the transfer.    Medications   lactated ringers BOLUS 1,000 mL (1,000 mLs Intravenous New Bag 6/14/21 1902)     Followed by   lactated ringers infusion (has no administration in time range)   iopamidol (ISOVUE-370) solution 93 mL (93 mLs Intravenous Given 6/14/21 2000)   sodium chloride 0.9 % bag 500mL for CT scan flush use (63 mLs As instructed Given 6/14/21 2001)   cefTRIAXone in d5w (ROCEPHIN) intermittent infusion 1 g (1 g Intravenous New Bag 6/14/21 2104)       Assessments & Plan (with Medical Decision Making)   66-year-old male past medical history reviewed as above who presents to the emergency department with concerns of insensate loss of urine control and inability to defecate as  well as fever as described in the HPI and documented with respect abnormals on exam.  Differential diagnostic considerations including a quina syndrome, epidural abscess, UTI, prostatitis, bowel obstruction amongst others was discussed with the patient and through the process of his evaluation which includes CT with IV and oral contrast abdomen pelvis as well as MRI of the lumbar spine, lab diagnostics reviewed as above it appears that this is most consistent with acute prostatitis.  The patient received IV antibiotics from the very beginning given his fever and associated symptoms.  He has antibiotic coverage is appropriate and was not changed.  He requires admission but there are no beds available at our facility and so he was transferred to [facility] where they had an available bed.  Discussion as above.      Disclaimer: This note consists of symbols derived from keyboarding, dictation and/or voice recognition software. As a result, there may be errors in the script that have gone undetected. Please consider this when interpreting information found in this chart.      I have reviewed the nursing notes.    I have reviewed the findings, diagnosis, plan and need for follow up with the patient.       New Prescriptions    No medications on file       Final diagnoses:   Prostatitis, acute       6/14/2021   [facility] EMERGENCY DEPT     Isac Gutierrez MD  06/15/21 [id number]

## 2021-06-14 NOTE — LETTER
June 16, 2021      Antione Block  [address]  [place] [id number]        Dear ,    We are writing to inform you of your test results.    His urine culture from 6/14/2021  was negative.  Resulted Orders   *UA reflex to Microscopic and Culture ([place] [facility] (except [place] and [place])   Result Value Ref Range    Color Urine Yellow     Appearance Urine Clear     Glucose Urine Negative NEG^Negative mg/dL    Bilirubin Urine Negative NEG^Negative    Ketones Urine Negative NEG^Negative mg/dL    Specific Gravity Urine <=1.005 1.003 - 1.035    Blood Urine Negative NEG^Negative    pH Urine 5.5 5.0 - 7.0 pH    Protein Albumin Urine Negative NEG^Negative mg/dL    Urobilinogen Urine 0.2 0.2 - 1.0 EU/dL    Nitrite Urine Negative NEG^Negative    Leukocyte Esterase Urine Small (A) NEG^Negative    Source Midstream Urine    Urine Microscopic   Result Value Ref Range    WBC Urine 5-10 (A) OTO5^0 - 5 /HPF    RBC Urine O - 2 OTO2^O - 2 /HPF    Squamous Epithelial /LPF Urine Few FEW^Few /LPF    Bacteria Urine Few (A) NEG^Negative /HPF   Urine Culture Aerobic Bacterial   Result Value Ref Range    Specimen Description Midstream Urine     Special Requests Specimen received in preservative     Culture Micro       <10,000 colonies/mL  mixed urogenital serafin  Susceptibility testing not routinely done         If you have any questions or concerns, please call the clinic at the number listed above.       Sincerely,      Avi Parker MD

## 2021-06-14 NOTE — PROGRESS NOTES
Assessment & Plan     Urinary incontinence  Patient is having decrease urinary output and likely having overflow incontinence.  He has acute onset of inability to urinate a normal amount for over 30 hours.  He states he was urinating fine up until noon yesterday.  He reports also he has a bowel movement twice a day and he has not had a bowel movement for 2 days. He is feeling feverish.  He has smoker's cough.  Has had 2 covid vaccines.  Today he has been leaking urine small amounts throughout the day and goes before he realizes it.  He gets up and leaks urine.  Has slight pain with urination.  Has had a fever today.    I am concerned about urinary outlet obstruction. Etiology due to enlarged prostate, constipation or mass.  Likely will need US to assess urine in the bladder, also look for hydronephrosis, need at least a straight catheterization and possible CT of abdomen along with lab work up cbc and comprehensive profile. Fever could be urinary source however other etiology does need to be rule out.  I did get a UA and culture.  Did not get to fully evaluate history to consider STI or get a urine sample to run.  Contacted ED physician for transfer to ED for further evaluation.  - *UA reflex to Microscopic and Culture ([place] and [facility] (except [place] and [place])  - Urine Microscopic    Urinary incontinence, unspecified type    - Urine Culture Aerobic Bacterial    Fever, unspecified fever cause    - Urine Culture Aerobic Bacterial             See Patient Instructions    No follow-ups on file.    Avi Parker MD  [facility] DANNA Talbot is a 66 year old who presents for the following health issues  accompanied by his spouse:    HPI     Genitourinary - Male  Onset/Duration: x 1 day.  Description:   Dysuria (painful urination): no  Hematuria (blood in urine): no  Frequency: YES  Waking at night to urinate: YES  Hesitancy (delay in urine): YES  Retention (unable  to empty): no  Decrease in urinary flow: YES  Incontinence: YES. Patient states when he has the urge to go and he stands up he will have accidents. Patient states he can't hold it.  Progression of Symptoms:  worsening  Accompanying Signs & Symptoms:  Fever: no but he has the chills. Wife states he has been breathing differently. He respirations were higher last night.  Back/Flank pain: YES  Urethral discharge: YES  Testicle lumps/masses/pain: no  Nausea and/or vomiting: no  Abdominal pain: YES- abdominal pressure. Patient states he has not had a bowel movement in 2 days. Patient states he normally goes twice a day.  History:   History of frequent UTI s: no  History of kidney stones: no  History of hernias: no  Personal or Family history of Prostate problems: no  Sexually active: no  Precipitating or alleviating factors: None  Therapies tried and outcome: cranberry juice and increase fluid intake  Had 2 covid vaccines.  Fever today.  Slight pain with urinating.  Cannot urinate normally.  Has had not bowel movement for 2 days per wife.   Leaks urine with change in position.  Has lower back pain.  No trauma.        Review of Systems         Objective    /64   Pulse 103   Temp 101.5  F (38.6  C) (Tympanic)   Resp 22   Wt 86.2 kg (190 lb)   SpO2 94%   BMI 28.47 kg/m    Body mass index is 28.47 kg/m .  Physical Exam   General Appears uncomfortable.  RESP: lungs clear to auscultation   CV: normal rate, regular rhythm, no murmur or gallop  ABDOMEN: +BS soft however appears distented.  MS: extremities normal, no peripheral edema  SKIN: no suspicious lesions or rashes  NEURO: Alert, oriented, speech and mentation normal  PSYCH: mentation appears normal., affect and mood normal      Results for orders placed or performed in visit on 06/14/21   *UA reflex to Microscopic and Culture ([place] and [facility] (except [place] and [place])     Status: Abnormal    Specimen: Midstream Urine   Result Value Ref Range     Color Urine Yellow     Appearance Urine Clear     Glucose Urine Negative NEG^Negative mg/dL    Bilirubin Urine Negative NEG^Negative    Ketones Urine Negative NEG^Negative mg/dL    Specific Gravity Urine <=1.005 1.003 - 1.035    Blood Urine Negative NEG^Negative    pH Urine 5.5 5.0 - 7.0 pH    Protein Albumin Urine Negative NEG^Negative mg/dL    Urobilinogen Urine 0.2 0.2 - 1.0 EU/dL    Nitrite Urine Negative NEG^Negative    Leukocyte Esterase Urine Small (A) NEG^Negative    Source Midstream Urine    Urine Microscopic     Status: Abnormal   Result Value Ref Range    WBC Urine 5-10 (A) OTO5^0 - 5 /HPF    RBC Urine O - 2 OTO2^O - 2 /HPF    Squamous Epithelial /LPF Urine Few FEW^Few /LPF    Bacteria Urine Few (A) NEG^Negative /HPF

## 2021-06-15 ENCOUNTER — TRANSFERRED RECORDS (OUTPATIENT)
Dept: HEALTH INFORMATION MANAGEMENT | Facility: CLINIC | Age: 66
End: 2021-06-15

## 2021-06-15 LAB
BACTERIA SPEC CULT: NORMAL
Lab: NORMAL
SPECIMEN SOURCE: NORMAL

## 2021-06-15 NOTE — ED NOTES
Patient 88% on room air after dilaudid administration. 3L nasal cannula placed on patient. Patient reported no pain after dilaudid administration.

## 2021-06-20 LAB
BACTERIA SPEC CULT: NO GROWTH
BACTERIA SPEC CULT: NO GROWTH
SPECIMEN SOURCE: NORMAL
SPECIMEN SOURCE: NORMAL

## 2021-06-29 ENCOUNTER — OFFICE VISIT (OUTPATIENT)
Dept: FAMILY MEDICINE | Facility: CLINIC | Age: 66
End: 2021-06-29
Payer: COMMERCIAL

## 2021-06-29 VITALS
DIASTOLIC BLOOD PRESSURE: 72 MMHG | OXYGEN SATURATION: 93 % | RESPIRATION RATE: 17 BRPM | HEART RATE: 87 BPM | BODY MASS INDEX: 30.01 KG/M2 | SYSTOLIC BLOOD PRESSURE: 126 MMHG | WEIGHT: 198 LBS | HEIGHT: 68 IN | TEMPERATURE: 98.8 F

## 2021-06-29 DIAGNOSIS — N41.0 ACUTE PROSTATITIS: Primary | ICD-10-CM

## 2021-06-29 LAB
ERYTHROCYTE [DISTWIDTH] IN BLOOD BY AUTOMATED COUNT: 12.6 % (ref 10–15)
HCT VFR BLD AUTO: 47.2 % (ref 40–53)
HGB BLD-MCNC: 15.7 G/DL (ref 13.3–17.7)
MCH RBC QN AUTO: 30.4 PG (ref 26.5–33)
MCHC RBC AUTO-ENTMCNC: 33.3 G/DL (ref 31.5–36.5)
MCV RBC AUTO: 91 FL (ref 78–100)
PLATELET # BLD AUTO: 306 10E9/L (ref 150–450)
RBC # BLD AUTO: 5.17 10E12/L (ref 4.4–5.9)
WBC # BLD AUTO: 15.8 10E9/L (ref 4–11)

## 2021-06-29 PROCEDURE — 93000 ELECTROCARDIOGRAM COMPLETE: CPT | Performed by: NURSE PRACTITIONER

## 2021-06-29 PROCEDURE — 36415 COLL VENOUS BLD VENIPUNCTURE: CPT | Performed by: NURSE PRACTITIONER

## 2021-06-29 PROCEDURE — 99214 OFFICE O/P EST MOD 30 MIN: CPT | Performed by: NURSE PRACTITIONER

## 2021-06-29 PROCEDURE — 85027 COMPLETE CBC AUTOMATED: CPT | Performed by: NURSE PRACTITIONER

## 2021-06-29 RX ORDER — TAMSULOSIN HYDROCHLORIDE 0.4 MG/1
0.4 CAPSULE ORAL
COMMUNITY
Start: 2021-06-17 | End: 2022-03-04

## 2021-06-29 RX ORDER — IBUPROFEN 600 MG/1
600 TABLET, FILM COATED ORAL PRN
COMMUNITY

## 2021-06-29 RX ORDER — CIPROFLOXACIN 500 MG/1
500 TABLET, FILM COATED ORAL 2 TIMES DAILY
COMMUNITY
Start: 2021-06-16 | End: 2021-07-12

## 2021-06-29 RX ORDER — FINASTERIDE 5 MG/1
5 TABLET, FILM COATED ORAL
COMMUNITY
Start: 2021-06-17

## 2021-06-29 ASSESSMENT — MIFFLIN-ST. JEOR: SCORE: 1652.62

## 2021-06-29 NOTE — PATIENT INSTRUCTIONS
1.  EKG today looks normal.  2.  CBC ordered and I will call you with results.  This is to evaluate your white blood cells that were increased with infection.  3.  I recommend follow-up with Dr. Parker in 1 month for recheck labs on Cipro due to long duration of treatment.  4.  If you start to have any worsening symptoms, follow-up in clinic also.

## 2021-06-29 NOTE — PROGRESS NOTES
Assessment & Plan     Acute prostatitis  EKG completed today to evaluate for stable QTc interval which is normal today and was normal prior to starting antibiotics.  CBC ordered for evaluation of WBC count to ensure it continues to drop to normal.  Patient will follow-up with urology as he can get in but it was 6 months booked out.  Recommend follow-up in clinic with primary care provider in 1 month for recheck labs on Cipro.  - EKG 12-lead complete w/read - Clinics  - CBC with platelets     See Patient Instructions    Return in about 1 month (around 7/29/2021) for Follow up.    Tessie Sheth NP  [facility] DANNA Talbot is a 66 year old who presents for the following health issues;     \A Chronology of [place] Hospit[facility]\""       Hospital Follow-up Visit:    Hospital/Nursing Home/IP Rehab Facility: [facility] and transferred to [facility]   Date of Admission: 6/14/2021  Date of Discharge: 6/16/2021  Reason(s) for Admission: Prostatitis, acute (Primary Dx);   Benign prostatic hyperplasia, unspecified whether lower urinary tract symptoms       Was your hospitalization related to COVID-19? No   Problems taking medications regularly:  None  Medication changes since discharge: None  Problems adhering to non-medication therapy:  None    Summary of hospitalization:  CareEverywhere information obtained and reviewed  Diagnostic Tests/Treatments reviewed.  Follow up needed: EKG on Cipro for evaluation of QT  Other Healthcare Providers Involved in Patient s Care:         None  Update since discharge: improved, still has some discomfort.  Post Discharge Medication Reconciliation: discharge medications reconciled, continue medications without change.  Plan of care communicated with patient          Appointment with urology in 6 weeks due to booking out that late.    Review of Systems   CONSTITUTIONAL: NEGATIVE for fever, chills, change in weight  RESP: NEGATIVE for significant cough or  "SOB  CV: NEGATIVE for chest pain, palpitations or peripheral edema  : positive for prostate tenderness  PSYCHIATRIC: NEGATIVE for changes in mood or affect  ROS otherwise negative      Objective    /72   Pulse 87   Temp 98.8  F (37.1  C) (Tympanic)   Resp 17   Ht 1.727 m (5' 8\")   Wt 89.8 kg (198 lb)   SpO2 93%   BMI 30.11 kg/m    Body mass index is 30.11 kg/m .  Physical Exam   GENERAL: healthy, alert and no distress  NECK: no adenopathy, no asymmetry, masses, or scars and thyroid normal to palpation  RESP: lungs clear to auscultation - no rales, rhonchi or wheezes  CV: regular rate and rhythm, normal S1 S2, no S3 or S4, no murmur, click or rub, no peripheral edema and peripheral pulses strong   (male): still has tenderness; exam deferred  PSYCH: mentation appears normal, affect normal/bright      "

## 2021-07-01 ENCOUNTER — TELEPHONE (OUTPATIENT)
Dept: FAMILY MEDICINE | Facility: CLINIC | Age: 66
End: 2021-07-01

## 2021-07-01 NOTE — TELEPHONE ENCOUNTER
Patient recently hospitalized for acute prostatitis . Riley removed 6/16- voiding without difficulty.   Placed on Cipro x4 weeks  flomax and Finasteride started.     Patient was told to make appointment with urology.   Made appointment for telephone visit with Dr. Diego to discuss next steps    Devan LUKE RN   Specialty Clinics

## 2021-07-01 NOTE — TELEPHONE ENCOUNTER
Patient's wife reports that they are unable to get in to be seen by the urologist until Aug 31. Patient is to be seen for prostatitis in 6 weeks from discharge which was 6/15/21. Advised patient's wife that we will notify Urology care team and they can reach out to them to see if they can get him in sooner. Encounter routed.  Minerva Kolb RN

## 2021-07-01 NOTE — TELEPHONE ENCOUNTER
Reason for Call:  Other appointment    Detailed comments: Patient wife called wanting an appt end of July but I couldn't find an opening. Patient wife wants call back if he can get in sooner.    Phone Number Patient can be reached at: Other phone number:  [phone]*    Best Time: Anytime    Can we leave a detailed message on this number? YES    Call taken on 7/1/2021 at 10:07 AM by Jadyn Luke

## 2021-07-07 ENCOUNTER — VIRTUAL VISIT (OUTPATIENT)
Dept: UROLOGY | Facility: CLINIC | Age: 66
End: 2021-07-07
Payer: COMMERCIAL

## 2021-07-07 DIAGNOSIS — R97.20 ELEVATED PROSTATE SPECIFIC ANTIGEN (PSA): Primary | ICD-10-CM

## 2021-07-07 PROCEDURE — 99207 PR NO CHARGE LOS: CPT | Performed by: UROLOGY

## 2021-07-07 NOTE — PROGRESS NOTES
Spoke with the patient and will have my office arrange for him to be seen in clinic.    Has an elevated PSA but this may be in the context of a urinary tract infection.

## 2021-07-14 ENCOUNTER — OFFICE VISIT (OUTPATIENT)
Dept: UROLOGY | Facility: CLINIC | Age: 66
End: 2021-07-14
Payer: COMMERCIAL

## 2021-07-14 VITALS — SYSTOLIC BLOOD PRESSURE: 131 MMHG | HEART RATE: 73 BPM | RESPIRATION RATE: 16 BRPM | DIASTOLIC BLOOD PRESSURE: 80 MMHG

## 2021-07-14 DIAGNOSIS — N30.00 ACUTE CYSTITIS WITHOUT HEMATURIA: ICD-10-CM

## 2021-07-14 DIAGNOSIS — R97.20 ELEVATED PROSTATE SPECIFIC ANTIGEN (PSA): Primary | ICD-10-CM

## 2021-07-14 LAB
ALBUMIN UR-MCNC: NEGATIVE MG/DL
APPEARANCE UR: CLEAR
BILIRUB UR QL STRIP: NEGATIVE
COLOR UR AUTO: YELLOW
GLUCOSE UR STRIP-MCNC: NEGATIVE MG/DL
HGB UR QL STRIP: NEGATIVE
KETONES UR STRIP-MCNC: NEGATIVE MG/DL
LEUKOCYTE ESTERASE UR QL STRIP: NEGATIVE
NITRATE UR QL: NEGATIVE
PH UR STRIP: 5.5 [PH] (ref 5–7)
RBC #/AREA URNS AUTO: NORMAL /HPF
SP GR UR STRIP: 1.02 (ref 1–1.03)
UROBILINOGEN UR STRIP-ACNC: 0.2 E.U./DL
WBC #/AREA URNS AUTO: NORMAL /HPF

## 2021-07-14 PROCEDURE — 87086 URINE CULTURE/COLONY COUNT: CPT | Performed by: UROLOGY

## 2021-07-14 PROCEDURE — 99213 OFFICE O/P EST LOW 20 MIN: CPT | Mod: 25 | Performed by: UROLOGY

## 2021-07-14 PROCEDURE — 81001 URINALYSIS AUTO W/SCOPE: CPT | Performed by: UROLOGY

## 2021-07-14 PROCEDURE — 51798 US URINE CAPACITY MEASURE: CPT | Performed by: UROLOGY

## 2021-07-14 NOTE — NURSING NOTE
"Chief Complaint   Patient presents with     Elevated PSA       Initial /80 (BP Location: Right arm, Patient Position: Chair, Cuff Size: Adult Regular)   Pulse 73   Resp 16  Estimated body mass index is 30.11 kg/m  as calculated from the following:    Height as of 6/29/21: 1.727 m (5' 8\").    Weight as of 6/29/21: 89.8 kg (198 lb).  BP completed using cuff size: regular   Medications and allergies reviewed.    Active order to obtain bladder scan? Yes   Name of ordering provider: Brandie   Bladder scan preformed post void No, pt denied.  Bladder scan reveled 33ML  Provider notified?  Yes    Callie REAL CMA               "

## 2021-07-14 NOTE — PROGRESS NOTES
Appointment source: Established Patient  Patient name: Antione Block  Urology Staff: John Diego MD    Subjective: This is a 66 year old year old male returning for follow up of evaded PSA.    Recently developed symptoms of urinary frequency and urgency.    He also had an episode of urge incontinence.    The presumption is he had a urinary tract infection although the urine culture is not conclusive.    He did have an elevated white blood cell count.    He was actually hospitalized treated with antibiotics and now discharged.    He reports that since starting a combination of finasteride and tamsulosin along with his intravenous and oral antibiotic therapy for presumed urinary tract infection he is feeling better and his urination pattern has returned to normal.    He does not have any daytime frequency any longer and has only occasional nocturia.    Objective: Healthy-appearing male in no distress    Abdomen benign no evidence of inguinal hernia    External genitalia are visibly and palpably normal    Prostate is benign to palpation.    Assessment: Elevated PSA (23.83 ng/mL) in the context of probable urinary tract infection.    Plan: PSA will be repeated in about 1 month.    If the PSA remains elevated I will order an MRI of the prostate.    Total time 15 minutes

## 2021-07-15 LAB — BACTERIA UR CULT: NO GROWTH

## 2022-03-04 ENCOUNTER — HOSPITAL ENCOUNTER (EMERGENCY)
Facility: CLINIC | Age: 67
Discharge: HOME | End: 2022-03-04
Attending: EMERGENCY MEDICINE | Admitting: EMERGENCY MEDICINE
Payer: COMMERCIAL

## 2022-03-04 VITALS
DIASTOLIC BLOOD PRESSURE: 94 MMHG | BODY MASS INDEX: 31.32 KG/M2 | TEMPERATURE: 97.8 F | WEIGHT: 206 LBS | HEART RATE: 73 BPM | OXYGEN SATURATION: 94 % | SYSTOLIC BLOOD PRESSURE: 148 MMHG | RESPIRATION RATE: 18 BRPM

## 2022-03-04 DIAGNOSIS — N40.1 BENIGN PROSTATIC HYPERPLASIA WITH LOWER URINARY TRACT SYMPTOMS, SYMPTOM DETAILS UNSPECIFIED: ICD-10-CM

## 2022-03-04 LAB
ALBUMIN UR-MCNC: NEGATIVE MG/DL
APPEARANCE UR: ABNORMAL
BILIRUB UR QL STRIP: NEGATIVE
COLOR UR AUTO: YELLOW
GLUCOSE UR STRIP-MCNC: NEGATIVE MG/DL
HGB UR QL STRIP: NEGATIVE
KETONES UR STRIP-MCNC: NEGATIVE MG/DL
LEUKOCYTE ESTERASE UR QL STRIP: ABNORMAL
MUCOUS THREADS #/AREA URNS LPF: PRESENT /LPF
NITRATE UR QL: NEGATIVE
PH UR STRIP: 6 [PH] (ref 5–7)
RBC URINE: <1 /HPF
SP GR UR STRIP: 1.02 (ref 1–1.03)
UROBILINOGEN UR STRIP-MCNC: NORMAL MG/DL
WBC URINE: 1 /HPF

## 2022-03-04 PROCEDURE — 99284 EMERGENCY DEPT VISIT MOD MDM: CPT | Mod: 25 | Performed by: EMERGENCY MEDICINE

## 2022-03-04 PROCEDURE — 51798 US URINE CAPACITY MEASURE: CPT | Performed by: EMERGENCY MEDICINE

## 2022-03-04 PROCEDURE — 99284 EMERGENCY DEPT VISIT MOD MDM: CPT | Performed by: EMERGENCY MEDICINE

## 2022-03-04 PROCEDURE — 81003 URINALYSIS AUTO W/O SCOPE: CPT | Performed by: EMERGENCY MEDICINE

## 2022-03-04 PROCEDURE — 87086 URINE CULTURE/COLONY COUNT: CPT | Performed by: EMERGENCY MEDICINE

## 2022-03-04 RX ORDER — TAMSULOSIN HYDROCHLORIDE 0.4 MG/1
0.4 CAPSULE ORAL DAILY
Qty: 90 CAPSULE | Refills: 0 | Status: SHIPPED | OUTPATIENT
Start: 2022-03-04

## 2022-03-04 RX ORDER — CIPROFLOXACIN 500 MG/1
500 TABLET, FILM COATED ORAL 2 TIMES DAILY
Qty: 20 TABLET | Refills: 0 | Status: SHIPPED | OUTPATIENT
Start: 2022-03-04 | End: 2022-03-14

## 2022-03-04 RX ORDER — TAMSULOSIN HYDROCHLORIDE 0.4 MG/1
0.4 CAPSULE ORAL DAILY
Qty: 90 CAPSULE | Refills: 0 | Status: SHIPPED | OUTPATIENT
Start: 2022-03-04 | End: 2022-03-04

## 2022-03-04 NOTE — ED TRIAGE NOTES
Pt here with urinary frequency and  incontinence for the past two weeks. Hx of enlarged prostate. Denies pain.

## 2022-03-04 NOTE — ED NOTES
States he is having intermittent urge incontinence, frequency, and urgency. Was taking finasteride and tamsulosin last year from Dr. Diego. Pt states he went off of it because he did not have any refills. Reports these medications did help.

## 2022-03-04 NOTE — ED PROVIDER NOTES
History     Chief Complaint   Patient presents with     Prostate Problem     Incontinence     urinary     HPI  Antione Block is a 66 year old male with history of BPH and urinary retention with presentation for evaluation of 2 weeks of urinary urgency, frequency and voiding small amounts, and hesitancy. No dysuria or hematuria.  No abdominal pain, perineal pain or back or flank pain.  No fever or chills.  He was previously on Flomax/tamsulosin and Proscar/finasteride but ran out of his rx for these ~ 7 months ago and has been off them since.  Previously followed by Dr. Diego of Urology here, but has not seen him in quite some time.  No other acute complaints or concerns.    Allergies:  Allergies   Allergen Reactions     Aleve [Naproxen] Anaphylaxis       Problem List:    Patient Active Problem List    Diagnosis Date Noted     Tobacco use disorder 02/02/2015     Priority: Medium     CARDIOVASCULAR SCREENING; LDL GOAL LESS THAN 130 10/31/2010     Priority: Medium        Past Medical History:    History reviewed. No pertinent past medical history.    Past Surgical History:    History reviewed. No pertinent surgical history.    Family History:    Family History   Problem Relation Age of Onset     Cancer Mother         bone and lung     Hypertension Father      Diabetes Father      Cardiovascular Brother      Heart Disease Brother      Diabetes Brother      Heart Disease Brother        Social History:  Marital Status:   [2]  Social History     Tobacco Use     Smoking status: Current Every Day Smoker     Packs/day: 0.50     Years: 43.00     Pack years: 21.50     Types: Cigarettes     Smokeless tobacco: Never Used   Substance Use Topics     Alcohol use: Not Currently     Comment: rare use      Drug use: No        Medications:    ciprofloxacin (CIPRO) 500 MG tablet  tamsulosin (FLOMAX) 0.4 MG capsule  Ascorbic Acid (VITAMIN C) 100 MG CHEW  aspirin 81 MG tablet  Cholecalciferol (VITAMIN D) 50 MCG (2000 UT)  CAPS  EPINEPHrine 0.3 MG/0.3ML injection  finasteride (PROSCAR) 5 MG tablet  Garlic 10 MG CAPS  ibuprofen (ADVIL/MOTRIN) 600 MG tablet  multivitamin (CENTRUM SILVER) tablet  Omega-3 Fatty Acids (OMEGA-3 FISH OIL EX ST) 880 MG CAPS  Turmeric 500 MG CAPS        Review of Systems  As mentioned above in the history present illness.  All other systems were reviewed and are negative.      Physical Exam   BP: (!) 163/89  Pulse: 81  Temp: 97.8  F (36.6  C)  Resp: 18  Weight: 93.4 kg (206 lb)  SpO2: 94 %      Physical Exam  Vitals and nursing note reviewed.   Constitutional:       General: He is not in acute distress.     Appearance: Normal appearance. He is well-developed. He is not ill-appearing or diaphoretic.   Eyes:      General: No scleral icterus.     Extraocular Movements: Extraocular movements intact.      Conjunctiva/sclera: Conjunctivae normal.   Neck:      Trachea: No tracheal deviation.   Cardiovascular:      Rate and Rhythm: Normal rate and regular rhythm.      Heart sounds: Normal heart sounds. No murmur heard.    No friction rub. No gallop.   Pulmonary:      Effort: Pulmonary effort is normal. No respiratory distress.      Breath sounds: Normal breath sounds. No wheezing, rhonchi or rales.   Abdominal:      General: There is no distension.      Palpations: Abdomen is soft.      Tenderness: There is no abdominal tenderness. There is no right CVA tenderness or left CVA tenderness.   Genitourinary:     Comments:  examination: Large smooth nontender prostate.  Exam otherwise unremarkable.  Musculoskeletal:         General: No tenderness. Normal range of motion.      Cervical back: Normal range of motion.      Right lower leg: No edema.      Left lower leg: No edema.   Skin:     General: Skin is warm and dry.      Coloration: Skin is not pale.      Findings: No erythema or rash.   Neurological:      General: No focal deficit present.      Mental Status: He is alert and oriented to person, place, and time.    Psychiatric:         Mood and Affect: Mood normal.         Behavior: Behavior normal.         ED Course             Procedures              Results for orders placed or performed during the hospital encounter of 03/04/22 (from the past 24 hour(s))   UA with Microscopic reflex to Culture    Specimen: Urine, Midstream   Result Value Ref Range    Color Urine Yellow Colorless, Straw, Light Yellow, Yellow    Appearance Urine Slightly Cloudy (A) Clear    Glucose Urine Negative Negative mg/dL    Bilirubin Urine Negative Negative    Ketones Urine Negative Negative mg/dL    Specific Gravity Urine 1.016 1.003 - 1.035    Blood Urine Negative Negative    pH Urine 6.0 5.0 - 7.0    Protein Albumin Urine Negative Negative mg/dL    Urobilinogen Urine Normal Normal, 2.0 mg/dL    Nitrite Urine Negative Negative    Leukocyte Esterase Urine Trace (A) Negative    Mucus Urine Present (A) None Seen /LPF    RBC Urine <1 <=2 /HPF    WBC Urine 1 <=5 /HPF    Narrative    Urine Culture not indicated       Medications - No data to display    Post void bladder scan: 31 ml    Assessments & Plan (with Medical Decision Making)   66 year old male with history of BPH and urinary retention with presentation for evaluation of 2 weeks of urinary urgency, frequency and voiding small amounts, and hesitancy. No dysuria or hematuria.  No abdominal pain, perineal pain or back or flank pain.  No fever or chills.  He was previously on Flomax/tamsulosin and Proscar/finasteride but ran out of his rx for these ~ 7 months ago and has been off them since.  Postvoid bladder scan showed only 31 mL.  UA showed only trace LCE.  Urine culture is pending.  No abdominal pain, back or flank pain or perineal pain.  Prostate was enlarged but nontender.  Doubt UTI, pyelonephritis or prostatitis.  Will Rx ciprofloxacin as a wait-and-see prescription and if symptoms do not resolve with resuming Flomax/tamsulosin therapy he will initiate Ciprofloxacin, or if urine culture  returns positive.  I made a referral for him to follow-up in Urology clinic in the near future. He was provided instructions for supportive care and will return as needed for worsened condition or worsening symptoms, or new problems or concerns.        I have reviewed the nursing notes.    I have reviewed the findings, diagnosis, plan and need for follow up with the patient.    Discharge Medication List as of 3/4/2022  4:41 PM          Final diagnoses:   Benign prostatic hyperplasia with lower urinary tract symptoms, symptom details unspecified       3/4/2022   [facility] EMERGENCY DEPT     Aimee, Jeffry VIEYRA MD  03/07/22 [id number]

## 2022-03-05 LAB — BACTERIA UR CULT: NO GROWTH

## 2022-03-06 ENCOUNTER — TELEPHONE (OUTPATIENT)
Dept: EMERGENCY MEDICINE | Facility: CLINIC | Age: 67
End: 2022-03-06
Payer: COMMERCIAL

## 2022-03-06 NOTE — TELEPHONE ENCOUNTER
"[facility] Emergency Department/Urgent Care Lab result notification:    [place] ED lab result protocol used  Urine culture    Reason for call  Notify of lab results, assess symptoms,  review ED providers recommendations/discharge instructions (if necessary) and advise per ED lab result f/u protocol    Lab Result   Final urine culture report shows \"NO GROWTH\" and is NEGATIVE.  [facility] Emergency Dept discharge antibiotic: Ciprofloxacin (Cipro) 500 mg tablet, 1 tablet (500 mg) by mouth 2 times daily for 10 days.  [facility] Emergency Dept discharge Rx antibiotic for UTI only (Yes/No): Benign prostatic hyperplasia with lower urinary tract symptoms.   RN to confirm if Patient took antibiotic within 3 days prior to urine culture collection.  Recommendations in treatment per [facility] ED Lab result Urine culture protocol.  Information table from Emergency Dept Provider visit on 3/4/22  Symptoms reported at ED visit (Chief complaint, HPI) Prostate Problem      Incontinence       urinary      HPI  Antione Block is a 66 year old male with history of BPH and urinary retention with presentation for evaluation of 2 weeks of urinary urgency, frequency and voiding small amounts, and hesitancy. No dysuria or hematuria.  No abdominal pain, perineal pain or back or flank pain.  No fever or chills.  He was previously on Flomax/tamsulosin and Proscar/finasteride but ran out of his rx for these ~ 7 months ago and has been off them since.  Previously followed by Dr. Diego of Urology here, but has not seen him in quite some time.  No other acute complaints or concerns.     ED providers Impression and Plan (applicable information) UA showed only trace LCE.  Urine culture is pending.  No abdominal pain, back or flank pain or peritoneal pain.  Prostate was enlarged but nontender.  Will Rx ciprofloxacin as a wait-and-see prescription and if symptoms do not resolve with resuming Flomax/tamsulosin therapy he will " "initiate ciprofloxacin, or if urine culture returns positive.  I made a referral for him to follow-up in urology clinic in the near future. He was provided instructions for supportive care and will return as needed for worsened condition or worsening symptoms, or new problems or concerns.   Miscellaneous information na     RN Assessment (Patient s current Symptoms), include time called.  [Insert Left message here if message left]  3:18PM: Spoke with patient. He states he is \"doing ok.\" States he is urinating ok, no fever, offers no other complaints. Has been taking the Flomax.   RN Recommendations/Instructions per [place] ED lab result protocol  Patient notified of lab result and treatment recommendations.  Reviewed the No growth urine culture with the patient. The patient states that the antibiotic is not for a bladder infection but is for a \"swollen prostate\". He has not picked up the Cipro yet, but plans to.   Advised to take the antibiotic as directed and to follow up with urology as directed by the ED provider.  The patient is comfortable with the information given and has no further questions.     Please Contact your PCP clinic or return to the Emergency department if your:    Symptoms worsen or other concerning symptom's.    PCP follow-up Questions asked: YES       Dione Burton RN  [facility]  Emergency Dept Lab Result RN  Ph# [phone]     Copy of Lab result   Urine Culture  Order: [id number]   Collected 3/4/2022  2:23 PM     Status: Final result     Visible to patient: No (inaccessible in MyChart)    Specimen Information: Urine, Midstream         2 Result Notes    Culture No Growth            Resulting Agency: [facility]           Specimen Collected: 03/04/22  2:23 PM Last Resulted: 03/05/22 10:36 PM                 "

## 2022-09-01 ENCOUNTER — APPOINTMENT (OUTPATIENT)
Dept: GENERAL RADIOLOGY | Facility: CLINIC | Age: 67
End: 2022-09-01
Attending: EMERGENCY MEDICINE
Payer: COMMERCIAL

## 2022-09-01 ENCOUNTER — HOSPITAL ENCOUNTER (EMERGENCY)
Facility: CLINIC | Age: 67
Discharge: HOME | End: 2022-09-01
Attending: EMERGENCY MEDICINE | Admitting: EMERGENCY MEDICINE
Payer: COMMERCIAL

## 2022-09-01 VITALS
SYSTOLIC BLOOD PRESSURE: 138 MMHG | HEIGHT: 69 IN | WEIGHT: 195 LBS | DIASTOLIC BLOOD PRESSURE: 88 MMHG | TEMPERATURE: 97.2 F | OXYGEN SATURATION: 94 % | BODY MASS INDEX: 28.88 KG/M2 | HEART RATE: 65 BPM | RESPIRATION RATE: 20 BRPM

## 2022-09-01 DIAGNOSIS — R04.2 HEMOPTYSIS: ICD-10-CM

## 2022-09-01 LAB
ALBUMIN SERPL BCG-MCNC: 4.2 G/DL (ref 3.5–5.2)
ALP SERPL-CCNC: 80 U/L (ref 40–129)
ALT SERPL W P-5'-P-CCNC: 18 U/L (ref 10–50)
ANION GAP SERPL CALCULATED.3IONS-SCNC: 9 MMOL/L (ref 7–15)
APTT PPP: 30 SECONDS (ref 22–38)
AST SERPL W P-5'-P-CCNC: 20 U/L (ref 10–50)
BASOPHILS # BLD AUTO: 0.1 10E3/UL (ref 0–0.2)
BASOPHILS NFR BLD AUTO: 1 %
BILIRUB SERPL-MCNC: 0.4 MG/DL
BUN SERPL-MCNC: 21.9 MG/DL (ref 8–23)
CALCIUM SERPL-MCNC: 9 MG/DL (ref 8.8–10.2)
CHLORIDE SERPL-SCNC: 107 MMOL/L (ref 98–107)
CREAT SERPL-MCNC: 0.91 MG/DL (ref 0.67–1.17)
D DIMER PPP FEU-MCNC: 0.57 UG/ML FEU (ref 0–0.5)
DEPRECATED HCO3 PLAS-SCNC: 23 MMOL/L (ref 22–29)
EOSINOPHIL # BLD AUTO: 0.1 10E3/UL (ref 0–0.7)
EOSINOPHIL NFR BLD AUTO: 1 %
ERYTHROCYTE [DISTWIDTH] IN BLOOD BY AUTOMATED COUNT: 12.8 % (ref 10–15)
FLUAV RNA SPEC QL NAA+PROBE: NEGATIVE
FLUBV RNA RESP QL NAA+PROBE: NEGATIVE
GFR SERPL CREATININE-BSD FRML MDRD: >90 ML/MIN/1.73M2
GLUCOSE SERPL-MCNC: 120 MG/DL (ref 70–99)
HCT VFR BLD AUTO: 48 % (ref 40–53)
HGB BLD-MCNC: 15.8 G/DL (ref 13.3–17.7)
IMM GRANULOCYTES # BLD: 0 10E3/UL
IMM GRANULOCYTES NFR BLD: 0 %
INR PPP: 1.03 (ref 0.85–1.15)
LYMPHOCYTES # BLD AUTO: 2.3 10E3/UL (ref 0.8–5.3)
LYMPHOCYTES NFR BLD AUTO: 22 %
MCH RBC QN AUTO: 30 PG (ref 26.5–33)
MCHC RBC AUTO-ENTMCNC: 32.9 G/DL (ref 31.5–36.5)
MCV RBC AUTO: 91 FL (ref 78–100)
MONOCYTES # BLD AUTO: 0.6 10E3/UL (ref 0–1.3)
MONOCYTES NFR BLD AUTO: 5 %
NEUTROPHILS # BLD AUTO: 7.5 10E3/UL (ref 1.6–8.3)
NEUTROPHILS NFR BLD AUTO: 71 %
NRBC # BLD AUTO: 0 10E3/UL
NRBC BLD AUTO-RTO: 0 /100
PLATELET # BLD AUTO: 276 10E3/UL (ref 150–450)
POTASSIUM SERPL-SCNC: 4.6 MMOL/L (ref 3.4–5.3)
PROT SERPL-MCNC: 7.1 G/DL (ref 6.4–8.3)
RBC # BLD AUTO: 5.27 10E6/UL (ref 4.4–5.9)
SARS-COV-2 RNA RESP QL NAA+PROBE: NEGATIVE
SODIUM SERPL-SCNC: 139 MMOL/L (ref 136–145)
WBC # BLD AUTO: 10.5 10E3/UL (ref 4–11)

## 2022-09-01 PROCEDURE — 36415 COLL VENOUS BLD VENIPUNCTURE: CPT | Performed by: EMERGENCY MEDICINE

## 2022-09-01 PROCEDURE — 85610 PROTHROMBIN TIME: CPT | Performed by: EMERGENCY MEDICINE

## 2022-09-01 PROCEDURE — 87636 SARSCOV2 & INF A&B AMP PRB: CPT | Performed by: EMERGENCY MEDICINE

## 2022-09-01 PROCEDURE — 99285 EMERGENCY DEPT VISIT HI MDM: CPT | Mod: CS,25 | Performed by: EMERGENCY MEDICINE

## 2022-09-01 PROCEDURE — 85025 COMPLETE CBC W/AUTO DIFF WBC: CPT | Performed by: EMERGENCY MEDICINE

## 2022-09-01 PROCEDURE — C9803 HOPD COVID-19 SPEC COLLECT: HCPCS | Performed by: EMERGENCY MEDICINE

## 2022-09-01 PROCEDURE — 93005 ELECTROCARDIOGRAM TRACING: CPT | Performed by: EMERGENCY MEDICINE

## 2022-09-01 PROCEDURE — 71046 X-RAY EXAM CHEST 2 VIEWS: CPT

## 2022-09-01 PROCEDURE — 82040 ASSAY OF SERUM ALBUMIN: CPT | Performed by: EMERGENCY MEDICINE

## 2022-09-01 PROCEDURE — 93010 ELECTROCARDIOGRAM REPORT: CPT | Performed by: EMERGENCY MEDICINE

## 2022-09-01 PROCEDURE — 85379 FIBRIN DEGRADATION QUANT: CPT | Performed by: EMERGENCY MEDICINE

## 2022-09-01 PROCEDURE — 80053 COMPREHEN METABOLIC PANEL: CPT | Performed by: EMERGENCY MEDICINE

## 2022-09-01 PROCEDURE — 85730 THROMBOPLASTIN TIME PARTIAL: CPT | Performed by: EMERGENCY MEDICINE

## 2022-09-01 PROCEDURE — 99285 EMERGENCY DEPT VISIT HI MDM: CPT | Mod: CS | Performed by: EMERGENCY MEDICINE

## 2022-09-01 ASSESSMENT — ACTIVITIES OF DAILY LIVING (ADL)
ADLS_ACUITY_SCORE: 33
ADLS_ACUITY_SCORE: 35

## 2022-09-01 NOTE — LETTER
September 1, 2022      To Whom It May Concern:      Antione Block was seen in our Emergency Department today, 09/01/22. May return to work on 9/2/2022 without restriction.     Sincerely,        Ubaldo Snell MD

## 2022-09-01 NOTE — ED NOTES
Pt reports he has had a smokers cough for years now, pt reports coughing up dark red blood this morning. Pt denies chest pain or sob at this time. Pt smokes 1.5  per day for the past 52 years.

## 2022-09-01 NOTE — ED PROVIDER NOTES
History     Chief Complaint   Patient presents with     Cough     With blood. Onset today.     HPI  Antione Block is a 67 year old male with history of tobacco use, prostatitis, who presents for evaluation of hemoptysis.  Was working and had a coughing paroxysm and coughed up a small amount of blood.  Patient smokes approximately 1.5 packs of cigarettes per day.  Has a chronic cough.  Says his cough is unchanged.  Has exertional dyspnea which is normal for him.  Does not have dyspnea at rest.  No fevers, chills, sore throat, abdominal pain, nausea, vomiting or diarrhea.  No abnormal bruising or bleeding.  No black or tarry stools.  No blood in stool.  No blood in urine.  Denies coughing up blood previously.  Happened the 1 occurrence only.  No feeling faint or loss of consciousness.  No chest pain.  No history of VTE.  Not on any medications.  No pain or swelling in his legs.    The patient's PMHx, Surgical Hx, Allergies, and Medications were all reviewed with the patient.    Allergies:  Allergies   Allergen Reactions     Aleve [Naproxen] Anaphylaxis       Problem List:    Patient Active Problem List    Diagnosis Date Noted     Tobacco use disorder 02/02/2015     Priority: Medium     CARDIOVASCULAR SCREENING; LDL GOAL LESS THAN 130 10/31/2010     Priority: Medium        Past Medical History:    No past medical history on file.    Past Surgical History:    No past surgical history on file.    Family History:    Family History   Problem Relation Age of Onset     Cancer Mother         bone and lung     Hypertension Father      Diabetes Father      Cardiovascular Brother      Heart Disease Brother      Diabetes Brother      Heart Disease Brother        Social History:  Marital Status:   [2]  Social History     Tobacco Use     Smoking status: Current Every Day Smoker     Packs/day: 0.50     Years: 43.00     Pack years: 21.50     Types: Cigarettes     Smokeless tobacco: Never Used   Substance Use Topics      "Alcohol use: Not Currently     Comment: rare use      Drug use: No        Medications:    Ascorbic Acid (VITAMIN C) 100 MG CHEW  aspirin 81 MG tablet  Cholecalciferol (VITAMIN D) 50 MCG (2000 UT) CAPS  EPINEPHrine 0.3 MG/0.3ML injection  finasteride (PROSCAR) 5 MG tablet  Garlic 10 MG CAPS  ibuprofen (ADVIL/MOTRIN) 600 MG tablet  multivitamin (CENTRUM SILVER) tablet  Omega-3 Fatty Acids (OMEGA-3 FISH OIL EX ST) 880 MG CAPS  tamsulosin (FLOMAX) 0.4 MG capsule  Turmeric 500 MG CAPS          Review of Systems  A complete review of systems performed and is otherwise negative except as noted in the HPI    Physical Exam   BP: (!) 164/82  Pulse: 70  Temp: 97.2  F (36.2  C)  Resp: 20  Height: 175.3 cm (5' 9\")  Weight: 88.5 kg (195 lb)  SpO2: 94 %    Physical Exam  GEN: Awake, alert, and cooperative. No acute distress.  Resting comfortably  HENT: Dried blood in left naris.  Oral mucosa dry  EYES: EOM intact. Conjunctiva clear. No discharge.   NECK: Symmetric, freely mobile.  No JVD  CV : Regular rate and rhythm.  No murmurs appreciated  PULM: Normal effort. No wheezes, rales, or rhonchi bilaterally.  ABD: Soft, non-tender, non-distended. No rebound or guarding.   NEURO: Normal speech. Following commands. CN II-XII grossly intact. Answering questions and interacting appropriately.   EXT: No gross deformity. Warm and well perfused.  No pitting pedal or pretibial edema  INT: Warm. No diaphoresis. Normal color.        ED Course        Procedures         EKG: Interpreted by myself.  Sinus rhythm with rate of 64 bpm.  Normal axis.  Normal R wave progression.  No T wave inversions.  No ST segment elevations or depressions.  No ectopy.  Unchanged compared to 6/29/2021.  Impression: Normal EKG.    Critical Care time:  none               Results for orders placed or performed during the hospital encounter of 09/01/22 (from the past 24 hour(s))   CBC with platelets differential    Narrative    The following orders were created for " panel order CBC with platelets differential.  Procedure                               Abnormality         Status                     ---------                               -----------         ------                     CBC with platelets and d...[[id number]]                      Final result                 Please view results for these tests on the individual orders.   Partial thromboplastin time   Result Value Ref Range    aPTT 30 22 - 38 Seconds   INR   Result Value Ref Range    INR 1.03 0.85 - 1.15   Comprehensive metabolic panel   Result Value Ref Range    Sodium 139 136 - 145 mmol/L    Potassium 4.6 3.4 - 5.3 mmol/L    Creatinine 0.91 0.67 - 1.17 mg/dL    Urea Nitrogen 21.9 8.0 - 23.0 mg/dL    Chloride 107 98 - 107 mmol/L    Carbon Dioxide (CO2) 23 22 - 29 mmol/L    Anion Gap 9 7 - 15 mmol/L    Glucose 120 (H) 70 - 99 mg/dL    Calcium 9.0 8.8 - 10.2 mg/dL    Protein Total 7.1 6.4 - 8.3 g/dL    Albumin 4.2 3.5 - 5.2 g/dL    Bilirubin Total 0.4 <=1.2 mg/dL    Alkaline Phosphatase 80 40 - 129 U/L    AST 20 10 - 50 U/L    ALT 18 10 - 50 U/L    GFR Estimate >90 >60 mL/min/1.73m2   D dimer quantitative   Result Value Ref Range    D-Dimer Quantitative 0.57 (H) 0.00 - 0.50 ug/mL FEU    Narrative    This D-dimer assay is intended for use in conjunction with a clinical pretest probability assessment model to exclude pulmonary embolism (PE) and deep venous thrombosis (DVT) in outpatients suspected of PE or DVT. The cut-off value is 0.50 ug/mL FEU.   Symptomatic; Unknown Influenza A/B & SARS-CoV2 (COVID-19) Virus PCR Multiplex Nasopharyngeal    Specimen: Nasopharyngeal; Swab   Result Value Ref Range    Influenza A PCR Negative Negative    Influenza B PCR Negative Negative    SARS CoV2 PCR Negative Negative    Narrative    Testing was performed using the mile SARS-CoV-2 & Influenza A/B Assay on the mile [employer] System. This test should be ordered for the detection of SARS-CoV-2 and influenza viruses in individuals who meet  clinical and/or epidemiological criteria. Test performance is unknown in asymptomatic patients. This test is for in vitro diagnostic use under the FDA EUA for laboratories certified under CLIA to perform moderate and/or high complexity testing. This test has not been FDA cleared or approved. A negative result does not rule out the presence of PCR inhibitors in the specimen or target RNA in concentration below the limit of detection for the assay. If only one viral target is positive but coinfection with multiple targets is suspected, the sample should be re-tested with another FDA cleared, approved or authorized test, if coinfection would change clinical management. [facility] are certified under the Clinical Laboratory Improvement Amendments of 1988 (CLIA-88) as  qualified to perform moderate and/or high complexity laboratory testing.   CBC with platelets and differential   Result Value Ref Range    WBC Count 10.5 4.0 - 11.0 10e3/uL    RBC Count 5.27 4.40 - 5.90 10e6/uL    Hemoglobin 15.8 13.3 - 17.7 g/dL    Hematocrit 48.0 40.0 - 53.0 %    MCV 91 78 - 100 fL    MCH 30.0 26.5 - 33.0 pg    MCHC 32.9 31.5 - 36.5 g/dL    RDW 12.8 10.0 - 15.0 %    Platelet Count 276 150 - 450 10e3/uL    % Neutrophils 71 %    % Lymphocytes 22 %    % Monocytes 5 %    % Eosinophils 1 %    % Basophils 1 %    % Immature Granulocytes 0 %    NRBCs per 100 WBC 0 <1 /100    Absolute Neutrophils 7.5 1.6 - 8.3 10e3/uL    Absolute Lymphocytes 2.3 0.8 - 5.3 10e3/uL    Absolute Monocytes 0.6 0.0 - 1.3 10e3/uL    Absolute Eosinophils 0.1 0.0 - 0.7 10e3/uL    Absolute Basophils 0.1 0.0 - 0.2 10e3/uL    Absolute Immature Granulocytes 0.0 <=0.4 10e3/uL    Absolute NRBCs 0.0 10e3/uL   XR Chest 2 Views    Narrative    CHEST TWO VIEWS 9/1/2022 10:30 AM     HISTORY: One episode of hemoptysis, smoker    COMPARISON: 5/4/2010       Impression    IMPRESSION: There are no acute infiltrates. The cardiac silhouette is  not enlarged. Pulmonary  vasculature is unremarkable.    MARQUISE CRESPO MD         SYSTEM ID:  [id number]       Medications - No data to display    Assessments & Plan (with Medical Decision Making)   67 year old male with past medical history significant for current tobacco use (1.5 packs/day) with 1 episode of hemoptysis this morning.  Patient has underlying smoker's cough which is unchanged.  No fevers or chills.  Cough is nonproductive.  On examination lungs were clear and no signs of respiratory distress.  Adequate oxygen saturations on room air.  Patient was noted to have dried blood in his left naris.  My suspicion is that epistaxis was the likely source of blood and less likely to be true hemoptysis from his lungs.  Basic lab work reassuring.  CBC was normal.  SARS-CoV-2 and influenza testing was negative.  INR 1.03.  PTT of 30.  CMP grossly normal, glucose 120.  D-dimer is below age-adjusted cut off at 0.57.  Do not feel any further work-up for PE necessary today.  No chest pain.  Change in dyspnea.  No signs of right heart strain on EKG and no clinical signs of DVT.  Chest x-ray without acute infiltrate, effusion or pneumothorax.    All results discussed with patient.  His wife is concerned because of his smoking history is developmental in answer.  Said that he had a family member who had it diagnosed at stage IV and  6 months later.  I did discuss a screening CT scan which could be arranged by his primary care provider.  I did not see any abnormalities on chest x-ray today that would warrant an emergent CT scan.    I have reviewed the nursing notes.         New Prescriptions    No medications on file       Final diagnoses:   Hemoptysis     Ubaldo Snell MD    2022   [facility] EMERGENCY DEPT    Disclaimer: This note consists of words and symbols derived from keyboarding and dictation using voice recognition software.  As a result, there may be errors that have gone undetected.  Please consider this when  interpreting information found in this note.             Ubaldo Snell MD  09/01/22 0551

## 2022-09-01 NOTE — DISCHARGE INSTRUCTIONS
I suspect that the blood that you coughed up earlier today came from your nose.     I strongly recommend that you quit smoking. There are many health benefits to quitting even now after you have been smoking for many years.     As we discussed, follow up with your primary care provider and I would ask about a screening CT scan of your chest.     If your symptoms worsen or you develop new or concerning symptoms, please return to the Emergency Department for further evaluation and treatment.

## 2022-10-03 ENCOUNTER — TELEPHONE (OUTPATIENT)
Dept: FAMILY MEDICINE | Facility: CLINIC | Age: 67
End: 2022-10-03

## 2022-10-03 NOTE — TELEPHONE ENCOUNTER
Order/Referral Request    Who is requesting: Meghann Aguilar [facility], [phone]    Orders being requested: Patient is having a pulmonary function test at [facility] asking for Insurance referral        Reason service is needed/diagnosis: Pulmonary function test    When are orders needed by: asap    Has this been discussed with Provider: No    Does patient have a preference on a Group/Provider/Facility? [facility] told Meghann to call us for insurance referral    Does patient have an appointment scheduled?: No    Where to send orders:     Okay to leave a detailed message?: Yes at Other phone number:  meghann Aguilar [facility], [phone]

## 2024-06-05 ENCOUNTER — OFFICE VISIT (OUTPATIENT)
Dept: URGENT CARE | Facility: URGENT CARE | Age: 69
End: 2024-06-05
Payer: COMMERCIAL

## 2024-06-05 VITALS
SYSTOLIC BLOOD PRESSURE: 162 MMHG | TEMPERATURE: 97 F | OXYGEN SATURATION: 95 % | DIASTOLIC BLOOD PRESSURE: 84 MMHG | HEART RATE: 65 BPM | BODY MASS INDEX: 29.39 KG/M2 | WEIGHT: 199 LBS | RESPIRATION RATE: 18 BRPM

## 2024-06-05 DIAGNOSIS — H61.22 IMPACTED CERUMEN OF LEFT EAR: ICD-10-CM

## 2024-06-05 DIAGNOSIS — H66.002 NON-RECURRENT ACUTE SUPPURATIVE OTITIS MEDIA OF LEFT EAR WITHOUT SPONTANEOUS RUPTURE OF TYMPANIC MEMBRANE: Primary | ICD-10-CM

## 2024-06-05 PROCEDURE — 69209 REMOVE IMPACTED EAR WAX UNI: CPT | Mod: LT | Performed by: PHYSICIAN ASSISTANT

## 2024-06-05 PROCEDURE — 99213 OFFICE O/P EST LOW 20 MIN: CPT | Mod: 25 | Performed by: PHYSICIAN ASSISTANT

## 2024-06-05 RX ORDER — ALBUTEROL SULFATE 90 UG/1
2 AEROSOL, METERED RESPIRATORY (INHALATION)
COMMUNITY
Start: 2023-07-12

## 2024-06-05 RX ORDER — MOMETASONE FUROATE AND FORMOTEROL FUMARATE DIHYDRATE 100; 5 UG/1; UG/1
2 AEROSOL RESPIRATORY (INHALATION) 2 TIMES DAILY
COMMUNITY

## 2024-06-05 RX ORDER — AMOXICILLIN 875 MG
875 TABLET ORAL 2 TIMES DAILY
Qty: 20 TABLET | Refills: 0 | Status: SHIPPED | OUTPATIENT
Start: 2024-06-05 | End: 2024-06-15

## 2024-06-05 NOTE — PROGRESS NOTES
"Patient presents with:  Ear Problem: For 2 wks pt has \"constant buzzing in the left ear, now it's starting to hurt\"      Clinical Decision Making:  Patient is facing left ear symptoms.  Cerumen impaction noted on exam.  This was successfully irrigated performed by medical assistant.  No complications.  Otitis media noted on exam following the ear irrigation.  Patient started on high-dose amoxicillin.      ICD-10-CM    1. Non-recurrent acute suppurative otitis media of left ear without spontaneous rupture of tympanic membrane  H66.002 amoxicillin (AMOXIL) 875 MG tablet      2. Impacted cerumen of left ear  H61.22           Patient Instructions   1) Take antibiotic as prescribed. Take this medication with food to avoid stomach upset.   2) Ibuprofen or Tylenol as needed for fever or pain.  3) Follow up in 5 days if not improving, sooner if worsening or other concerns.       HPI:  Antione Block is a 69 year old male who presents today with concerns of left ear discomfort, buzzing sensation and decreased hearing.  Hearing changes that been going on for about 2 weeks, but the pain just started hurting.    History obtained from the patient.    Problem List:  2015-02: Tobacco use disorder  2010-10: CARDIOVASCULAR SCREENING; LDL GOAL LESS THAN 130      No past medical history on file.    Social History     Tobacco Use    Smoking status: Every Day     Current packs/day: 0.50     Average packs/day: 0.5 packs/day for 43.0 years (21.5 ttl pk-yrs)     Types: Cigarettes    Smokeless tobacco: Never   Substance Use Topics    Alcohol use: Not Currently     Comment: rare use        Review of Systems    Vitals:    06/05/24 1059 06/05/24 1100   BP: (!) 145/89 (!) 162/84   Pulse: 65    Resp: 18    Temp: 97  F (36.1  C)    TempSrc: Tympanic    SpO2: 95%    Weight: 90.3 kg (199 lb)        Physical Exam  Vitals and nursing note reviewed.   Constitutional:       General: He is not in acute distress.     Appearance: He is not " toxic-appearing or diaphoretic.   HENT:      Head: Normocephalic and atraumatic.      Right Ear: Tympanic membrane, ear canal and external ear normal.      Left Ear: Ear canal and external ear normal. There is impacted cerumen. Tympanic membrane is erythematous and bulging. Tympanic membrane is not perforated or retracted.   Eyes:      Conjunctiva/sclera: Conjunctivae normal.   Cardiovascular:      Rate and Rhythm: Normal rate and regular rhythm.      Heart sounds: No murmur heard.  Pulmonary:      Effort: Pulmonary effort is normal. No respiratory distress.   Neurological:      Mental Status: He is alert.   Psychiatric:         Mood and Affect: Mood normal.         Behavior: Behavior normal.         Thought Content: Thought content normal.         Judgment: Judgment normal.       At the end of the encounter, I discussed results, diagnosis, medications. Discussed red flags for immediate return to clinic/ER, as well as indications for follow up if no improvement. Patient understood and agreed to plan. Patient was stable for discharge.

## 2024-06-05 NOTE — PATIENT INSTRUCTIONS
1) Take antibiotic as prescribed. Take this medication with food to avoid stomach upset.   2) Ibuprofen or Tylenol as needed for fever or pain.  3) Follow up in 5 days if not improving, sooner if worsening or other concerns.

## 2024-07-28 ENCOUNTER — OFFICE VISIT (OUTPATIENT)
Dept: URGENT CARE | Facility: URGENT CARE | Age: 69
End: 2024-07-28
Payer: COMMERCIAL

## 2024-07-28 VITALS
HEART RATE: 80 BPM | RESPIRATION RATE: 18 BRPM | BODY MASS INDEX: 28.94 KG/M2 | OXYGEN SATURATION: 95 % | WEIGHT: 196 LBS | DIASTOLIC BLOOD PRESSURE: 75 MMHG | SYSTOLIC BLOOD PRESSURE: 125 MMHG | TEMPERATURE: 98.2 F

## 2024-07-28 DIAGNOSIS — Z72.0 TOBACCO ABUSE: ICD-10-CM

## 2024-07-28 DIAGNOSIS — R30.0 BURNING WITH URINATION: Primary | ICD-10-CM

## 2024-07-28 DIAGNOSIS — K59.00 CONSTIPATION, UNSPECIFIED CONSTIPATION TYPE: ICD-10-CM

## 2024-07-28 LAB
ALBUMIN UR-MCNC: NEGATIVE MG/DL
APPEARANCE UR: CLEAR
BILIRUB UR QL STRIP: NEGATIVE
COLOR UR AUTO: YELLOW
GLUCOSE UR STRIP-MCNC: NEGATIVE MG/DL
HGB UR QL STRIP: NEGATIVE
KETONES UR STRIP-MCNC: NEGATIVE MG/DL
LEUKOCYTE ESTERASE UR QL STRIP: ABNORMAL
MUCOUS THREADS #/AREA URNS LPF: PRESENT /LPF
NITRATE UR QL: NEGATIVE
PH UR STRIP: 5.5 [PH] (ref 5–7)
RBC #/AREA URNS AUTO: ABNORMAL /HPF
SP GR UR STRIP: 1.01 (ref 1–1.03)
UROBILINOGEN UR STRIP-ACNC: 0.2 E.U./DL
WBC #/AREA URNS AUTO: ABNORMAL /HPF

## 2024-07-28 PROCEDURE — 87088 URINE BACTERIA CULTURE: CPT | Performed by: FAMILY MEDICINE

## 2024-07-28 PROCEDURE — 87186 SC STD MICRODIL/AGAR DIL: CPT | Performed by: FAMILY MEDICINE

## 2024-07-28 PROCEDURE — 81001 URINALYSIS AUTO W/SCOPE: CPT | Performed by: FAMILY MEDICINE

## 2024-07-28 PROCEDURE — 87086 URINE CULTURE/COLONY COUNT: CPT | Performed by: FAMILY MEDICINE

## 2024-07-28 PROCEDURE — 99213 OFFICE O/P EST LOW 20 MIN: CPT | Performed by: FAMILY MEDICINE

## 2024-07-28 NOTE — PROGRESS NOTES
Assessment & Plan     Burning with urination  Constipation, unspecified constipation type  Differentials discussed in detail 69-year-old male presented with some burning micturition, decreased urination and constipation, experiencing symptoms for past 1 week or so.  Differentials discussed in detail and offered ER transfer however patient declined.  UA findings unremarkable, urine culture sent.  Recommended well hydration and to continue current medication including tamsulosin.  Instructed to follow-up with PCP next week, probably will benefit from CT abdomen/pelvis and urology consult if symptoms persist, due for colonoscopy as well.  All questions answered.  - UA Macroscopic with reflex to Microscopic and Culture  - UA Microscopic with Reflex to Culture  - Urine Culture    Tobacco abuse  Smoking cessation counseling provided, associated health hazards explained in detail      Nicotine/Tobacco Cessation  He reports that he has been smoking cigarettes. He has a 21.5 pack-year smoking history. He has never used smokeless tobacco.  Nicotine/Tobacco Cessation Plan  Information offered: Patient not interested at this time      Lavon Talbot is a 69 year old, presenting for the following health issues:  Urinary Problem and Constipation    HPI   Burning with urination started Monday or Tuesday. Says very little urination in the morning and says can hardly feel himself going. Says when he has a BM says very little and when he wakes in the morning his stomach seems to be sore. Is using a prostate beta 2 pill. Says the constipation started around that time. Says his BM's were runny before then. Never had colonoscopy.       Review of Systems  Constitutional, HEENT, cardiovascular, pulmonary, gi and gu systems are negative, except as otherwise noted.      Objective    /75   Pulse 80   Temp 98.2  F (36.8  C) (Tympanic)   Resp 18   Wt 88.9 kg (196 lb)   SpO2 95%   BMI 28.94 kg/m    Body mass index is 28.94  kg/m .  Physical Exam   GENERAL: alert and no distress  NECK: no adenopathy, no asymmetry, masses, or scars  HEENT: Normal oropharynx  RESP: no wheezes  ABDOMEN: soft, nontender, no hepatosplenomegaly, no masses  MS: no gross musculoskeletal defects noted, no edema  NEURO: Normal strength and tone, mentation intact and speech normal  PSYCH: mentation appears normal, affect normal/bright    Results for orders placed or performed in visit on 07/28/24   UA Macroscopic with reflex to Microscopic and Culture     Status: Abnormal    Specimen: Urine, Clean Catch   Result Value Ref Range    Color Urine Yellow Colorless, Straw, Light Yellow, Yellow    Appearance Urine Clear Clear    Glucose Urine Negative Negative mg/dL    Bilirubin Urine Negative Negative    Ketones Urine Negative Negative mg/dL    Specific Gravity Urine 1.015 1.003 - 1.035    Blood Urine Negative Negative    pH Urine 5.5 5.0 - 7.0    Protein Albumin Urine Negative Negative mg/dL    Urobilinogen Urine 0.2 0.2, 1.0 E.U./dL    Nitrite Urine Negative Negative    Leukocyte Esterase Urine Trace (A) Negative   UA Microscopic with Reflex to Culture     Status: Abnormal   Result Value Ref Range    RBC Urine 2-5 (A) 0-2 /HPF /HPF    WBC Urine 10-25 (A) 0-5 /HPF /HPF    Mucus Urine Present (A) None Seen /LPF           Signed Electronically by: Ovidio Pozo MD

## 2024-07-30 ENCOUNTER — TELEPHONE (OUTPATIENT)
Dept: URGENT CARE | Facility: URGENT CARE | Age: 69
End: 2024-07-30
Payer: COMMERCIAL

## 2024-07-30 DIAGNOSIS — N39.0 ACUTE UTI: Primary | ICD-10-CM

## 2024-07-30 LAB — BACTERIA UR CULT: ABNORMAL

## 2024-07-30 RX ORDER — NITROFURANTOIN 25; 75 MG/1; MG/1
100 CAPSULE ORAL 2 TIMES DAILY
Qty: 14 CAPSULE | Refills: 0 | Status: SHIPPED | OUTPATIENT
Start: 2024-07-30 | End: 2024-08-06

## 2024-07-30 NOTE — TELEPHONE ENCOUNTER
Patient would like antibiotic sent to the [facility].    Juliette Anderson LPN

## 2024-07-30 NOTE — TELEPHONE ENCOUNTER
Please call patient and inform him that his urine culture was positive and he need to start antibiotic treatment. Please check which pharmacy he would like treatment sent to.